# Patient Record
Sex: FEMALE | Race: BLACK OR AFRICAN AMERICAN | NOT HISPANIC OR LATINO | Employment: FULL TIME | URBAN - METROPOLITAN AREA
[De-identification: names, ages, dates, MRNs, and addresses within clinical notes are randomized per-mention and may not be internally consistent; named-entity substitution may affect disease eponyms.]

---

## 2017-01-10 ENCOUNTER — OFFICE VISIT (OUTPATIENT)
Dept: FAMILY MEDICINE | Facility: CLINIC | Age: 22
End: 2017-01-10
Payer: COMMERCIAL

## 2017-01-10 VITALS
TEMPERATURE: 98.2 F | SYSTOLIC BLOOD PRESSURE: 124 MMHG | DIASTOLIC BLOOD PRESSURE: 84 MMHG | OXYGEN SATURATION: 97 % | HEART RATE: 118 BPM | WEIGHT: 288.4 LBS | BODY MASS INDEX: 40.38 KG/M2 | HEIGHT: 71 IN

## 2017-01-10 DIAGNOSIS — Z78.9 BODY PIERCING: ICD-10-CM

## 2017-01-10 DIAGNOSIS — Z23 NEED FOR HPV VACCINE: ICD-10-CM

## 2017-01-10 DIAGNOSIS — Z12.4 SCREENING FOR MALIGNANT NEOPLASM OF CERVIX: ICD-10-CM

## 2017-01-10 DIAGNOSIS — Z11.3 SCREENING EXAMINATION FOR VENEREAL DISEASE: ICD-10-CM

## 2017-01-10 DIAGNOSIS — Z00.00 ROUTINE HISTORY AND PHYSICAL EXAMINATION OF ADULT: Primary | ICD-10-CM

## 2017-01-10 DIAGNOSIS — K80.20 CALCULUS OF GALLBLADDER WITHOUT CHOLECYSTITIS WITHOUT OBSTRUCTION: ICD-10-CM

## 2017-01-10 LAB
HBA1C MFR BLD: 4.5 % (ref 4.3–6)
TSH SERPL DL<=0.005 MIU/L-ACNC: 1.64 MU/L (ref 0.4–4)

## 2017-01-10 PROCEDURE — G0145 SCR C/V CYTO,THINLAYER,RESCR: HCPCS | Performed by: NURSE PRACTITIONER

## 2017-01-10 PROCEDURE — 83036 HEMOGLOBIN GLYCOSYLATED A1C: CPT | Performed by: NURSE PRACTITIONER

## 2017-01-10 PROCEDURE — 99385 PREV VISIT NEW AGE 18-39: CPT | Performed by: NURSE PRACTITIONER

## 2017-01-10 PROCEDURE — 87491 CHLMYD TRACH DNA AMP PROBE: CPT | Performed by: NURSE PRACTITIONER

## 2017-01-10 PROCEDURE — 87591 N.GONORRHOEAE DNA AMP PROB: CPT | Performed by: NURSE PRACTITIONER

## 2017-01-10 PROCEDURE — 90651 9VHPV VACCINE 2/3 DOSE IM: CPT | Performed by: NURSE PRACTITIONER

## 2017-01-10 PROCEDURE — 36415 COLL VENOUS BLD VENIPUNCTURE: CPT | Performed by: NURSE PRACTITIONER

## 2017-01-10 PROCEDURE — 84443 ASSAY THYROID STIM HORMONE: CPT | Performed by: NURSE PRACTITIONER

## 2017-01-10 RX ORDER — MUPIROCIN 20 MG/G
OINTMENT TOPICAL 3 TIMES DAILY
Qty: 15 G | Refills: 0 | Status: SHIPPED | OUTPATIENT
Start: 2017-01-10 | End: 2017-01-17

## 2017-01-10 ASSESSMENT — PAIN SCALES - GENERAL: PAINLEVEL: NO PAIN (0)

## 2017-01-10 NOTE — PROGRESS NOTES
SUBJECTIVE:     CC: Daphne Pollard is an 21 year old woman who presents for preventive health visit.     Healthy Habits:    Do you get at least three servings of calcium containing foods daily (dairy, green leafy vegetables, etc.)? yes    Amount of exercise or daily activities, outside of work: 5 day(s) per week    Problems taking medications regularly not applicable    Medication side effects: No    Have you had an eye exam in the past two years? no    Do you see a dentist twice per year? no    Do you have sleep apnea, excessive snoring or daytime drowsiness?no        Chief Complaint   Patient presents with     Physical     Discuss past gallstones issues, weight, and infected nose piercing.     Patient has a history of gallstones.  She had imaging done in the past that showed gallstones.  She denies pain, nausea at this time.  She wonders if anything else needs to be done at this time.    Patient had her right nare pierced in 10/16.  She has noticed some occasional pus with pulling out her piercing.  She notices swelling on occasion as well.    Today's PHQ-2 Score:   PHQ-2 ( 1999 Pfizer) 1/10/2017   Q1: Little interest or pleasure in doing things 0   Q2: Feeling down, depressed or hopeless 0   PHQ-2 Score 0       Abuse: Current or Past(Physical, Sexual or Emotional)- No  Do you feel safe in your environment - Yes    Social History   Substance Use Topics     Smoking status: Former Smoker     Types: Cigarettes     Quit date: 01/10/2015     Smokeless tobacco: Not on file     Alcohol Use: Yes     The patient does not drink >3 drinks per day nor >7 drinks per week.    No results for input(s): CHOL, HDL, LDL, TRIG, CHOLHDLRATIO, NHDL in the last 59769 hours.    Reviewed orders with patient.  Reviewed health maintenance and updated orders accordingly - Yes    Mammo Decision Support:  Mammogram not appropriate for this patient based on age.    Pertinent mammograms are reviewed under the imaging tab.  History of  abnormal Pap smear: NO - age 21-29 PAP every 3 years recommended  All Histories reviewed and updated in Epic.      ROS:  C: NEGATIVE for fever, chills, change in weight  I: NEGATIVE for worrisome rashes, moles or lesions  E: NEGATIVE for vision changes or irritation  ENT: NEGATIVE for ear, mouth and throat problems  R: NEGATIVE for significant cough or SOB  B: NEGATIVE for masses, tenderness or discharge  CV: NEGATIVE for chest pain, palpitations or peripheral edema  GI: NEGATIVE for nausea, abdominal pain, heartburn, or change in bowel habits  : NEGATIVE for unusual urinary or vaginal symptoms. Periods are regular.  M: NEGATIVE for significant arthralgias or myalgia  N: NEGATIVE for weakness, dizziness or paresthesias  P: NEGATIVE for changes in mood or affect    Problem list, Medication list, Allergies, and Medical/Social/Surgical histories reviewed in EPIC and updated as appropriate.  Labs reviewed in EPIC  BP Readings from Last 3 Encounters:   01/10/17 124/84    Wt Readings from Last 3 Encounters:   01/10/17 288 lb 6.4 oz (130.817 kg)                  Patient Active Problem List   Diagnosis     Calculus of gallbladder without cholecystitis without obstruction     Past Surgical History   Procedure Laterality Date     As rad resec tonsil/pillars  2002     Ent surgery       adenoidectomy       Social History   Substance Use Topics     Smoking status: Former Smoker     Types: Cigarettes     Quit date: 01/10/2015     Smokeless tobacco: Not on file     Alcohol Use: Yes     Family History   Problem Relation Age of Onset     Hypertension Mother      Coronary Artery Disease Mother      DIABETES Mother      Hypertension Father      Coronary Artery Disease Father      DIABETES Maternal Grandmother      DIABETES Maternal Grandfather      Hypertension Paternal Grandmother      Coronary Artery Disease Paternal Grandmother      Hypertension Paternal Grandfather      Coronary Artery Disease Paternal Grandfather       "Depression Sister      Anxiety Disorder Sister      CEREBROVASCULAR DISEASE Paternal Half-Sister          Current Outpatient Prescriptions   Medication Sig Dispense Refill     mupirocin (BACTROBAN) 2 % ointment Apply topically 3 times daily for 7 days 15 g 0     No Known Allergies  No lab results found.   OBJECTIVE:     /100 mmHg  Pulse 118  Temp(Src) 98.2  F (36.8  C) (Oral)  Ht 5' 10.5\" (1.791 m)  Wt 288 lb 6.4 oz (130.817 kg)  BMI 40.78 kg/m2  SpO2 97%  LMP 12/12/2016 (Approximate)  Breastfeeding? No  EXAM:  GENERAL: healthy, alert and no distress  EYES: Eyes grossly normal to inspection, PERRL and conjunctivae and sclerae normal  HENT: ear canals and TM's normal, nose and mouth without ulcers or lesions  NECK: no adenopathy, no asymmetry, masses, or scars and thyroid normal to palpation  RESP: lungs clear to auscultation - no rales, rhonchi or wheezes  BREAST: normal without masses, tenderness or nipple discharge and no palpable axillary masses or adenopathy  CV: regular rate and rhythm, normal S1 S2, no S3 or S4, no murmur, click or rub, no peripheral edema and peripheral pulses strong  ABDOMEN: soft, nontender, no hepatosplenomegaly, no masses and bowel sounds normal   (female): normal female external genitalia, normal urethral meatus, vaginal mucosa, normal cervix/adnexa/uterus without masses or discharge  MS: no gross musculoskeletal defects noted, no edema  SKIN: Piercing noted to right nare, some mild erythema surrounding piercing.  PSYCH: mentation appears normal, affect normal/bright    ASSESSMENT/PLAN:     1. Routine history and physical examination of adult      2. BMI 40.0-44.9, adult (H)    - Hemoglobin A1c  - TSH with free T4 reflex  - NUTRITION REFERRAL    3. Screening examination for venereal disease    - NEISSERIA GONORRHOEA PCR  - CHLAMYDIA TRACHOMATIS PCR    4. Screening for malignant neoplasm of cervix    - Pap imaged thin layer screen only - recommended age 21 - 24 years    5. " "Need for HPV vaccine    - HUMAN PAPILLOMA VIRUS (GARDASIL 9) VACCINE    6. Body piercing    - mupirocin (BACTROBAN) 2 % ointment; Apply topically 3 times daily for 7 days  Dispense: 15 g; Refill: 0    7. Calculus of gallbladder without cholecystitis without obstruction  I discussed s/s of cholecystitis with patient.  Since she is not currently having any pain, I recommended continued low fat diet and monitoring for symptoms.  Patient verbalized understanding.       COUNSELING:   Reviewed preventive health counseling, as reflected in patient instructions         reports that she quit smoking about 2 years ago. Her smoking use included Cigarettes. She does not have any smokeless tobacco history on file.    Estimated body mass index is 40.78 kg/(m^2) as calculated from the following:    Height as of this encounter: 5' 10.5\" (1.791 m).    Weight as of this encounter: 288 lb 6.4 oz (130.817 kg).   Weight management plan: Discussed healthy diet and exercise guidelines and patient will follow up in 12 months in clinic to re-evaluate.    Counseling Resources:  ATP IV Guidelines  Pooled Cohorts Equation Calculator  Breast Cancer Risk Calculator  FRAX Risk Assessment  ICSI Preventive Guidelines  Dietary Guidelines for Americans, 2010  USDA's MyPlate  ASA Prophylaxis  Lung CA Screening    AISHWARYA Gonzalez CNP  [facility]  "

## 2017-01-10 NOTE — MR AVS SNAPSHOT
After Visit Summary   1/10/2017    Daphne Pollard    MRN: [id number]           Patient Information     Date Of Birth          1995        Visit Information        Provider Department      1/10/2017 10:20 AM Therese River APRN [facility] [place]        Today's Diagnoses     Routine history and physical examination of adult    -  1     BMI 40.0-44.9, adult (H)         Screening examination for venereal disease         Screening for malignant neoplasm of cervix         Need for HPV vaccine         Body piercing         Calculus of gallbladder without cholecystitis without obstruction           Care Instructions      Preventive Health Recommendations  Female Ages 18 to 25     Yearly exam:     See your health care provider every year in order to  o Review health changes.   o Discuss preventive care.    o Review your medicines if your doctor has prescribed any.      You should be tested each year for STDs (sexually transmitted diseases).       After age 20, talk to your provider about how often you should have cholesterol testing.      Starting at age 21, get a Pap test every three years. If you have an abnormal result, your doctor may have you test more often.      If you are at risk for diabetes, you should have a diabetes test (fasting glucose).     Shots:     Get a flu shot each year.     Get a tetanus shot every 10 years.     Consider getting the shot (vaccine) that prevents cervical cancer (Gardasil).    Nutrition:     Eat at least 5 servings of fruits and vegetables each day.    Eat whole-grain bread, whole-wheat pasta and brown rice instead of white grains and rice.    Talk to your provider about Calcium and Vitamin D.     Lifestyle    Exercise at least 150 minutes a week each week (30 minutes a day, 5 days a week). This will help you control your weight and prevent disease.    Limit alcohol to one drink per day.    No smoking.     Wear sunscreen to prevent skin  cancer.    See your dentist every six months for an exam and cleaning.      [facility]    If you have any questions regarding to your visit please contact your care team:     Team Pink:   Clinic Hours Telephone Number   Internal Medicine:  Dr. Chrissie River, NP       7am-7pm  Monday - Thursday   7am-5pm  Fridays  [phone]  (Appointment scheduling available 24/7)    Questions about your visit?  Team Line  [phone]   [facility] [place] [facility] 11am-9pm Monday-Friday Saturday-Sunday- 9am-5pm   [place] - 5pm-9pm Monday-Friday Saturday-Sunday- 9am-5pm  [phone] - [place]   [phone] - [place]       What options do I have for visits at the clinic other than the traditional office visit?  To expand how we care for you, many of our providers are utilizing electronic visits (e-visits) and telephone visits, when medically appropriate, for interactions with their patients rather than a visit in the clinic.   We also offer nurse visits for many medical concerns. Just like any other service, we will bill your insurance company for this type of visit based on time spent on the phone with your provider. Not all insurance companies cover these visits. Please check with your medical insurance if this type of visit is covered. You will be responsible for any charges that are not paid by your insurance.      E-visits via [employer]:  generally incur a $35.00 fee.  Telephone visits:  Time spent on the phone: *charged based on time that is spent on the phone in increments of 10 minutes. Estimated cost:   5-10 mins $30.00   11-20 mins. $59.00   21-30 mins. $85.00   Use [employer] (secure email communication and access to your chart) to send your primary care provider a message or make an appointment. Ask someone on your Team how to sign up for [employer].    For a Price Quote for your services, please call our Consumer Price Line at  "[phone].    As always, Thank you for trusting us with your health care needs!    Discharged by Daphne SANTANA CMA ([facility])          Follow-ups after your visit        Additional Services     NUTRITION REFERRAL       Your provider has referred you to: [place]: [facility] [phone]   [email or web address]    Please be aware that coverage of these services is subject to the terms and limitations of your health insurance plan.  Call member services at your health plan with any benefit or coverage questions.      Please bring the following with you to your appointment:    (1) This referral request  (2) Any documents given to you regarding this referral  (3) Any specific questions you have about diet and/or food choices                  Who to contact     If you have questions or need follow up information about today's clinic visit or your schedule please contact [facility] directly at [phone].  Normal or non-critical lab and imaging results will be communicated to you by MyChart, letter or phone within 4 business days after the clinic has received the results. If you do not hear from us within 7 days, please contact the clinic through [employer] or phone. If you have a critical or abnormal lab result, we will notify you by phone as soon as possible.  Submit refill requests through [employer] or call your pharmacy and they will forward the refill request to us. Please allow 3 business days for your refill to be completed.          Additional Information About Your Visit        [employer] Information     [employer] lets you send messages to your doctor, view your test results, renew your prescriptions, schedule appointments and more. To sign up, go to [email or web address] . Click on \"Log in\" on the left side of the screen, which will take you to the Welcome page. Then click on \"Sign up Now\" on the right side of the page.     You will be asked to enter the access code " "listed below, as well as some personal information. Please follow the directions to create your username and password.     Your access code is: [email or web address]  Expires: 4/10/2017 11:27 AM     Your access code will  in 90 days. If you need help or a new code, please call your [facility] clinic or [phone].        Care EveryWhere ID     This is your Care EveryWhere ID. This could be used by other organizations to access your [facility] medical records  [id number]        Your Vitals Were     Pulse Temperature Height    118 98.2  F (36.8  C) (Oral) 5' 10.5\" (1.791 m)    BMI (Body Mass Index) Pulse Oximetry Last Period    40.78 kg/m2 97% 2016 (Approximate)    Breastfeeding?          No         Blood Pressure from Last 3 Encounters:   01/10/17 124/84    Weight from Last 3 Encounters:   01/10/17 288 lb 6.4 oz (130.817 kg)              We Performed the Following     CHLAMYDIA TRACHOMATIS PCR     Hemoglobin A1c     HUMAN PAPILLOMA VIRUS (GARDASIL 9) VACCINE     NEISSERIA GONORRHOEA PCR     NUTRITION REFERRAL     Pap imaged thin layer screen only - recommended age 21 - 24 years     TSH with free T4 reflex          Today's Medication Changes          These changes are accurate as of: 1/10/17 11:27 AM.  If you have any questions, ask your nurse or doctor.               Start taking these medicines.        Dose/Directions    mupirocin 2 % ointment   Commonly known as:  BACTROBAN   Used for:  Body piercing   Started by:  Therese River APRN CNP        Apply topically 3 times daily for 7 days   Quantity:  15 g   Refills:  0            Where to get your medicines      These medications were sent to [facility] - [address]  [address], [place] [address]     Phone:  [phone]    - mupirocin 2 % ointment             Primary Care Provider Office Phone # Fax #    Rocío Payton [phone]       [facility] " JULESMIRYAM KENNETH [facility] [address]  [facility] [address]        Thank you!     Thank you for choosing [facility]  for your care. Our goal is always to provide you with excellent care. Hearing back from our patients is one way we can continue to improve our services. Please take a few minutes to complete the written survey that you may receive in the mail after your visit with us. Thank you!             Your Updated Medication List - Protect others around you: Learn how to safely use, store and throw away your medicines at www.disposemymeds.org.          This list is accurate as of: 1/10/17 11:27 AM.  Always use your most recent med list.                   Brand Name Dispense Instructions for use    mupirocin 2 % ointment    BACTROBAN    15 g    Apply topically 3 times daily for 7 days

## 2017-01-10 NOTE — NURSING NOTE
"Chief Complaint   Patient presents with     Physical     Discuss past gallstones issues, weight, and infected nose piercing.       Initial /100 mmHg  Pulse 118  Temp(Src) 98.2  F (36.8  C) (Oral)  Ht 5' 10.5\" (1.791 m)  Wt 288 lb 6.4 oz (130.817 kg)  BMI 40.78 kg/m2  SpO2 97%  LMP 12/12/2016 (Approximate)  Breastfeeding? No Estimated body mass index is 40.78 kg/(m^2) as calculated from the following:    Height as of this encounter: 5' 10.5\" (1.791 m).    Weight as of this encounter: 288 lb 6.4 oz (130.817 kg).  BP completed using cuff size: tim King CMA      "

## 2017-01-10 NOTE — NURSING NOTE
Screening Questionnaire for Adult Immunization    Are you sick today?   No   Do you have allergies to medications, food, a vaccine component or latex?   No   Have you ever had a serious reaction after receiving a vaccination?   No   Do you have a long-term health problem with heart disease, lung disease, asthma, kidney disease, metabolic disease (e.g. diabetes), anemia, or other blood disorder?   No   Do you have cancer, leukemia, HIV/AIDS, or any other immune system problem?   No   In the past 3 months, have you taken medications that affect  your immune system, such as prednisone, other steroids, or anticancer drugs; drugs for the treatment of rheumatoid arthritis, Crohn s disease, or psoriasis; or have you had radiation treatments?   No   Have you had a seizure, or a brain or other nervous system problem?   No   During the past year, have you received a transfusion of blood or blood     products, or been given immune (gamma) globulin or antiviral drug?   No   For women: Are you pregnant or is there a chance you could become        pregnant during the next month?   No   Have you received any vaccinations in the past 4 weeks?   No     Immunization questionnaire answers were all negative.      MNVFC doesn't apply on this patient    Per orders of Therese River, injection of HPV given by Daphne Dumont. Patient instructed to remain in clinic for 20 minutes afterwards, and to report any adverse reaction to me immediately.       Screening performed by Daphne Dumont on 1/10/2017 at 11:46 AM.

## 2017-01-10 NOTE — PATIENT INSTRUCTIONS
Preventive Health Recommendations  Female Ages 18 to 25     Yearly exam:     See your health care provider every year in order to  o Review health changes.   o Discuss preventive care.    o Review your medicines if your doctor has prescribed any.      You should be tested each year for STDs (sexually transmitted diseases).       After age 20, talk to your provider about how often you should have cholesterol testing.      Starting at age 21, get a Pap test every three years. If you have an abnormal result, your doctor may have you test more often.      If you are at risk for diabetes, you should have a diabetes test (fasting glucose).     Shots:     Get a flu shot each year.     Get a tetanus shot every 10 years.     Consider getting the shot (vaccine) that prevents cervical cancer (Gardasil).    Nutrition:     Eat at least 5 servings of fruits and vegetables each day.    Eat whole-grain bread, whole-wheat pasta and brown rice instead of white grains and rice.    Talk to your provider about Calcium and Vitamin D.     Lifestyle    Exercise at least 150 minutes a week each week (30 minutes a day, 5 days a week). This will help you control your weight and prevent disease.    Limit alcohol to one drink per day.    No smoking.     Wear sunscreen to prevent skin cancer.    See your dentist every six months for an exam and cleaning.      [facility]    If you have any questions regarding to your visit please contact your care team:     Team Pink:   Clinic Hours Telephone Number   Internal Medicine:  Dr. Chrissie River, NP       7am-7pm  Monday - Thursday   7am-5pm  Fridays  [phone]  (Appointment scheduling available 24/7)    Questions about your visit?  Team Line  [phone]   Urgent Care - [place] - 11am-9pm Monday-Friday Saturday-Sunday- 9am-5pm   [place] - 5pm-9pm Monday-Friday Saturday-Sunday- 9am-5pm  [phone] - [place]    [phone] - [place]       What options do I have for visits at the clinic other than the traditional office visit?  To expand how we care for you, many of our providers are utilizing electronic visits (e-visits) and telephone visits, when medically appropriate, for interactions with their patients rather than a visit in the clinic.   We also offer nurse visits for many medical concerns. Just like any other service, we will bill your insurance company for this type of visit based on time spent on the phone with your provider. Not all insurance companies cover these visits. Please check with your medical insurance if this type of visit is covered. You will be responsible for any charges that are not paid by your insurance.      E-visits via [employer]:  generally incur a $35.00 fee.  Telephone visits:  Time spent on the phone: *charged based on time that is spent on the phone in increments of 10 minutes. Estimated cost:   5-10 mins $30.00   11-20 mins. $59.00   21-30 mins. $85.00   Use [employer] (secure email communication and access to your chart) to send your primary care provider a message or make an appointment. Ask someone on your Team how to sign up for [employer].    For a Price Quote for your services, please call our Consumer Price Line at [phone].    As always, Thank you for trusting us with your health care needs!    Discharged by Daphne SANTANA CMA ([facility])

## 2017-01-10 NOTE — Clinical Note
[address]  [place] [address]  [phone]      January 17, 2017    Daphne Pollard  [address]   [place] [address]          Dear Daphne,    I am happy to inform you that your recent cervical cancer screening test (PAP smear) was normal.      Preventative screening such as this helps insure your health for years to come.  This test should be repeated in 3 years unless otherwise directed.    You will still need to return to the clinic every year for your annual exam and other preventive tests.    Please contact the clinic if you have further questions.      Sincerely,    AISHWARYA Gonzalez CNP/rlm

## 2017-01-10 NOTE — Clinical Note
[facility]  [address] [place] [address]    January 11, 2017    Daphne Pollard  [address]   [place] [address]          Dear Daphne,  Normal thyroid.  No evidence of diabetes.  Good 3 month blood sugar average.  No gonorrhea or chlamydia detected.  Enclosed is a copy of your results.     Results for orders placed or performed in visit on 01/10/17   Hemoglobin A1c   Result Value Ref Range    Hemoglobin A1C 4.5 4.3 - 6.0 %   TSH with free T4 reflex   Result Value Ref Range    TSH 1.64 0.40 - 4.00 mU/L   NEISSERIA GONORRHOEA PCR   Result Value Ref Range    Specimen Descrip Cervix     N Gonorrhea PCR  NEG     Negative   Negative for N. gonorrhoeae rRNA by transcription mediated amplification.   A negative result by transcription mediated amplification does not preclude the   presence of N. gonorrhoeae infection because results are dependent on proper   and adequate collection, absence of inhibitors, and sufficient rRNA to be   detected.     CHLAMYDIA TRACHOMATIS PCR   Result Value Ref Range    Specimen Description Cervix     Chlamydia Trachomatis PCR  NEG     Negative   Negative for C. trachomatis rRNA by transcription mediated amplification.   A negative result by transcription mediated amplification does not preclude the   presence of C. trachomatis infection because results are dependent on proper   and adequate collection, absence of inhibitors, and sufficient rRNA to be   detected.         If you have any questions or concerns, please call myself or my nurse at [phone].      Sincerely,        Therese River CNP/pb

## 2017-01-11 LAB
C TRACH DNA SPEC QL NAA+PROBE: NORMAL
N GONORRHOEA DNA SPEC QL NAA+PROBE: NORMAL
SPECIMEN SOURCE: NORMAL
SPECIMEN SOURCE: NORMAL

## 2017-01-11 NOTE — PROGRESS NOTES
Quick Note:    Dear Daphne,    Your recent test results are attached.     Normal thyroid.  No evidence of diabetes. Good 3 month blood sugar average.  No gonorrhea or chlamydia detected.      If you have any questions please feel free to contact [phone] or myself via InSpahart.    Sincerely,  Therese River, CNP    ______

## 2017-01-12 LAB
COPATH REPORT: NORMAL
PAP: NORMAL

## 2017-04-05 ENCOUNTER — OFFICE VISIT (OUTPATIENT)
Dept: URGENT CARE | Facility: URGENT CARE | Age: 22
End: 2017-04-05
Payer: COMMERCIAL

## 2017-04-05 VITALS
OXYGEN SATURATION: 100 % | HEART RATE: 98 BPM | TEMPERATURE: 98.1 F | WEIGHT: 274 LBS | DIASTOLIC BLOOD PRESSURE: 74 MMHG | BODY MASS INDEX: 38.76 KG/M2 | SYSTOLIC BLOOD PRESSURE: 112 MMHG

## 2017-04-05 DIAGNOSIS — R07.0 THROAT PAIN: Primary | ICD-10-CM

## 2017-04-05 LAB
DEPRECATED S PYO AG THROAT QL EIA: NORMAL
MICRO REPORT STATUS: NORMAL
SPECIMEN SOURCE: NORMAL

## 2017-04-05 PROCEDURE — 87081 CULTURE SCREEN ONLY: CPT | Performed by: PHYSICIAN ASSISTANT

## 2017-04-05 PROCEDURE — 87880 STREP A ASSAY W/OPTIC: CPT | Performed by: PHYSICIAN ASSISTANT

## 2017-04-05 PROCEDURE — 99213 OFFICE O/P EST LOW 20 MIN: CPT | Performed by: FAMILY MEDICINE

## 2017-04-05 NOTE — PROGRESS NOTES
SUBJECTIVE:Daphne Pollard is a 21 year old female with a chief complaint of sore throat.    Onset of symptoms was 2 day(s) ago.    Course of illness: still present.    Severity moderate  Current and Associated symptoms: nasal congestion  Treatment measures tried include OTC meds.  Predisposing factors include None.    No past medical history on file.  No Known Allergies  Social History   Substance Use Topics     Smoking status: Former Smoker     Types: Cigarettes     Quit date: 1/10/2015     Smokeless tobacco: Not on file     Alcohol use Yes       ROS:  SKIN: no rash  GI: no vomiting    OBJECTIVE:   /74 (BP Location: Right arm, Patient Position: Chair, Cuff Size: Adult Large)  Pulse 98  Temp 98.1  F (36.7  C) (Oral)  Wt 274 lb (124.3 kg)  SpO2 100%  BMI 38.76 kg/[id number] APPEARANCE: healthy, alert and no distress  EYES: EOMI,  PERRL, conjunctiva clear  HENT: ear canals and TM's normal.  Nose normal.  Pharynx erythematous with some exudate noted.  NECK: supple, non-tender to palpation, no adenopathy noted  RESP: lungs clear to auscultation - no rales, rhonchi or wheezes  SKIN: no suspicious lesions or rashes    Rapid Strep test is negative; await throat culture results.      ICD-10-CM    1. Throat pain R07.0 Strep, Rapid Screen     Beta strep group A culture       Symptomatic treat with gargles, lozenges, and OTC analgesic as needed.  Follow-up with primary clinic if not improving.

## 2017-04-05 NOTE — NURSING NOTE
"Chief Complaint   Patient presents with     URI     congestion and st for 2 days       Initial /74 (BP Location: Right arm, Patient Position: Chair, Cuff Size: Adult Large)  Pulse 98  Temp 98.1  F (36.7  C) (Oral)  Wt 274 lb (124.3 kg)  SpO2 100%  BMI 38.76 kg/m2 Estimated body mass index is 38.76 kg/(m^2) as calculated from the following:    Height as of 1/10/17: 5' 10.5\" (1.791 m).    Weight as of this encounter: 274 lb (124.3 kg).  Medication Reconciliation: complete   Ottoniel DUCKWORTH      "

## 2017-04-05 NOTE — MR AVS SNAPSHOT
"              After Visit Summary   4/5/2017    Daphne Pollard    MRN: [id number]           Patient Information     Date Of Birth          1995        Visit Information        Provider Department      4/5/2017 4:00 PM Mike Allen, DO [facility]        Today's Diagnoses     Throat pain    -  1       Follow-ups after your visit        Your next 10 appointments already scheduled     Apr 11, 2017 10:00 AM CDT   Nurse Only with [facility]   [facility]    [address] [address] [phone]              Who to contact     If you have questions or need follow up information about today's clinic visit or your schedule please contact [facility] directly at [phone].  Normal or non-critical lab and imaging results will be communicated to you by MyChart, letter or phone within 4 business days after the clinic has received the results. If you do not hear from us within 7 days, please contact the clinic through MyChart or phone. If you have a critical or abnormal lab result, we will notify you by phone as soon as possible.  Submit refill requests through Wowboard or call your pharmacy and they will forward the refill request to us. Please allow 3 business days for your refill to be completed.          Additional Information About Your Visit        MyChart Information     Wowboard lets you send messages to your doctor, view your test results, renew your prescriptions, schedule appointments and more. To sign up, go to [email or web address] . Click on \"Log in\" on the left side of the screen, which will take you to the Welcome page. Then click on \"Sign up Now\" on the right side of the page.     You will be asked to enter the access code listed below, as well as some personal information. Please follow the directions to create your username and password.     Your access code is: " [email or web address]  Expires: 4/10/2017 12:27 PM     Your access code will  in 90 days. If you need help or a new code, please call your [place] clinic or [phone].        Care EveryWhere ID     This is your Care EveryWhere ID. This could be used by other organizations to access your [place] medical records  [id number]        Your Vitals Were     Pulse Temperature Pulse Oximetry BMI (Body Mass Index)          98 98.1  F (36.7  C) (Oral) 100% 38.76 kg/m2         Blood Pressure from Last 3 Encounters:   17 112/74   01/10/17 124/84    Weight from Last 3 Encounters:   17 274 lb (124.3 kg)   01/10/17 288 lb 6.4 oz (130.8 kg)              We Performed the Following     Beta strep group A culture     Strep, Rapid Screen        Primary Care Provider Office Phone # Fax #    Rocío FISHER MD [place] [phone]       [facility] [address]  [place] [address]        Thank you!     Thank you for choosing [facility]  for your care. Our goal is always to provide you with excellent care. Hearing back from our patients is one way we can continue to improve our services. Please take a few minutes to complete the written survey that you may receive in the mail after your visit with us. Thank you!             Your Updated Medication List - Protect others around you: Learn how to safely use, store and throw away your medicines at www.disposemymeds.org.      Notice  As of 2017  5:40 PM    You have not been prescribed any medications.

## 2017-04-07 LAB
BACTERIA SPEC CULT: NORMAL
MICRO REPORT STATUS: NORMAL
SPECIMEN SOURCE: NORMAL

## 2017-04-18 ENCOUNTER — ALLIED HEALTH/NURSE VISIT (OUTPATIENT)
Dept: NURSING | Facility: CLINIC | Age: 22
End: 2017-04-18
Payer: COMMERCIAL

## 2017-04-18 DIAGNOSIS — Z23 NEED FOR VACCINATION: Primary | ICD-10-CM

## 2017-04-18 PROCEDURE — 99207 ZZC NO CHARGE NURSE ONLY: CPT

## 2017-04-18 PROCEDURE — 90471 IMMUNIZATION ADMIN: CPT

## 2017-04-18 PROCEDURE — 90651 9VHPV VACCINE 2/3 DOSE IM: CPT

## 2017-04-18 NOTE — NURSING NOTE
Prior to injection verified patient identity using patient's name and date of birth.Patient instructed to remain in clinic for 20 minutes afterwards, and to report any adverse reaction immediately.  Screening Questionnaire for Adult Immunization    Are you sick today?   No   Do you have allergies to medications, food, a vaccine component or latex?   No   Have you ever had a serious reaction after receiving a vaccination?   No   Do you have a long-term health problem with heart disease, lung disease, asthma, kidney disease, metabolic disease (e.g. diabetes), anemia, or other blood disorder?   No   Do you have cancer, leukemia, HIV/AIDS, or any other immune system problem?   No   In the past 3 months, have you taken medications that affect  your immune system, such as prednisone, other steroids, or anticancer drugs; drugs for the treatment of rheumatoid arthritis, Crohn s disease, or psoriasis; or have you had radiation treatments?   No   Have you had a seizure, or a brain or other nervous system problem?   No   During the past year, have you received a transfusion of blood or blood     products, or been given immune (gamma) globulin or antiviral drug?   No   For women: Are you pregnant or is there a chance you could become        pregnant during the next month?   No   Have you received any vaccinations in the past 4 weeks?   No     Immunization questionnaire answers were all negative.      MNVFC doesn't apply on this patient       Screening performed by LIO NORWOOD on 4/18/2017 at 12:20 PM.

## 2017-04-18 NOTE — MR AVS SNAPSHOT
"              After Visit Summary   2017    Daphne Pollard    MRN: [id number]           Patient Information     Date Of Birth          1995        Visit Information        Provider Department      2017 12:00 PM [facility] [place]        Today's Diagnoses     Need for vaccination    -  1       Follow-ups after your visit        Who to contact     If you have questions or need follow up information about today's clinic visit or your schedule please contact [facility] directly at [phone].  Normal or non-critical lab and imaging results will be communicated to you by MyChart, letter or phone within 4 business days after the clinic has received the results. If you do not hear from us within 7 days, please contact the clinic through GoIP Internationalhart or phone. If you have a critical or abnormal lab result, we will notify you by phone as soon as possible.  Submit refill requests through Edsby or call your pharmacy and they will forward the refill request to us. Please allow 3 business days for your refill to be completed.          Additional Information About Your Visit        MyChart Information     Edsby lets you send messages to your doctor, view your test results, renew your prescriptions, schedule appointments and more. To sign up, go to [email or web address] . Click on \"Log in\" on the left side of the screen, which will take you to the Welcome page. Then click on \"Sign up Now\" on the right side of the page.     You will be asked to enter the access code listed below, as well as some personal information. Please follow the directions to create your username and password.     Your access code is: [email or web address]  Expires: 7/10/2017 10:37 AM     Your access code will  in 90 days. If you need help or a new code, please call your [facility] or [phone].        Care EveryWhere ID     This is your Care EveryWhere ID. This could be used by other organizations to " access your [place] medical records  [id number]         Blood Pressure from Last 3 Encounters:   04/05/17 112/74   01/10/17 124/84    Weight from Last 3 Encounters:   04/05/17 274 lb (124.3 kg)   01/10/17 288 lb 6.4 oz (130.8 kg)              We Performed the Following     1st  Administration  [[id number]]     HUMAN PAPILLOMA VIRUS (GARDASIL 9) VACCINE [[id number]]        Primary Care Provider Office Phone # Fax #    Rocío Payton [phone]       [facility] [address]  [place] [address]        Thank you!     Thank you for choosing [facility]  for your care. Our goal is always to provide you with excellent care. Hearing back from our patients is one way we can continue to improve our services. Please take a few minutes to complete the written survey that you may receive in the mail after your visit with us. Thank you!             Your Updated Medication List - Protect others around you: Learn how to safely use, store and throw away your medicines at www.disposemymeds.org.      Notice  As of 4/18/2017 12:31 PM    You have not been prescribed any medications.

## 2017-05-23 ENCOUNTER — TELEPHONE (OUTPATIENT)
Dept: INTERNAL MEDICINE | Facility: CLINIC | Age: 22
End: 2017-05-23

## 2017-05-23 NOTE — TELEPHONE ENCOUNTER
Called and spoke with Daphne she provided the fax number [phone].    And a copy was left a the  for . Theodora Lyle,

## 2017-05-23 NOTE — TELEPHONE ENCOUNTER
Reason for Call: Request for an order or referral:    Order or referral being requested: Referral     Date needed: as soon as possible    Has the patient been seen by the PCP for this problem? NO     Additional comments: Patient is scheduled for  tomorrow at 11:40am with [facility]. Patient was told she needs a referral from her primary. Please call when ready for her to pick it up.     Phone number Patient can be reached at:  Home number on file [phone] (home)    Best Time:  any    Can we leave a detailed message on this number?  YES    Call taken on 2017 at 3:10 PM by Cyndee Trivedi

## 2017-06-03 ENCOUNTER — HEALTH MAINTENANCE LETTER (OUTPATIENT)
Age: 22
End: 2017-06-03

## 2017-07-31 NOTE — PROGRESS NOTES
SUBJECTIVE:                                                    Daphne Pollard is a 21 year old female who presents to clinic today for the following health issues:      Patient presents with:  Contraception    Patient is interested in contraception.  Patient notes that her menses are regular and last 5 days.  She denies menorrhagia.  She complains of mild cramping with period.    She denies history of DVT/PE, migraine with aura, cardiovascular disease, liver disease, clotting disorder, hypertension, diabetes mellitus.       Problem list and histories reviewed & adjusted, as indicated.  Additional history: as documented    Patient Active Problem List   Diagnosis     Calculus of gallbladder without cholecystitis without obstruction     Past Surgical History:   Procedure Laterality Date     AS RAD RESEC TONSIL/PILLARS  2002     ENT SURGERY      adenoidectomy       Social History   Substance Use Topics     Smoking status: Former Smoker     Types: Cigarettes     Quit date: 1/10/2015     Smokeless tobacco: Never Used     Alcohol use Yes     Family History   Problem Relation Age of Onset     CEREBROVASCULAR DISEASE Paternal Half-Sister      Hypertension Mother      Coronary Artery Disease Mother      DIABETES Mother      Hypertension Father      Coronary Artery Disease Father      DIABETES Maternal Grandmother      DIABETES Maternal Grandfather      Hypertension Paternal Grandmother      Coronary Artery Disease Paternal Grandmother      Hypertension Paternal Grandfather      Coronary Artery Disease Paternal Grandfather      Depression Sister      Anxiety Disorder Sister          Current Outpatient Prescriptions   Medication Sig Dispense Refill     medroxyPROGESTERone (DEPO-PROVERA) 150 MG/ML injection Inject 1 mL (150 mg) into the muscle every 3 months 1 mL 3     No Known Allergies  BP Readings from Last 3 Encounters:   08/02/17 136/80   04/05/17 112/74   01/10/17 124/84    Wt Readings from Last 3 Encounters:  "  08/02/17 266 lb (120.7 kg)   04/05/17 274 lb (124.3 kg)   01/10/17 288 lb 6.4 oz (130.8 kg)                  Labs reviewed in EPIC        Reviewed and updated as needed this visit by clinical staff       Reviewed and updated as needed this visit by Provider         ROS:  Constitutional, HEENT, cardiovascular, pulmonary, gi and gu systems are negative, except as otherwise noted.      OBJECTIVE:   /80  Pulse 102  Temp 98  F (36.7  C) (Oral)  Ht 5' 10.5\" (1.791 m)  Wt 266 lb (120.7 kg)  LMP 07/28/2017  SpO2 97%  BMI 37.63 kg/m2  Body mass index is 37.63 kg/(m^2).  GENERAL: healthy, alert and no distress  RESP: lungs clear to auscultation - no rales, rhonchi or wheezes  CV: regular rate and rhythm, normal S1 S2, no S3 or S4, no murmur, click or rub, no peripheral edema and peripheral pulses strong  MS: no gross musculoskeletal defects noted, no edema    Diagnostic Test Results:  Results for orders placed or performed in visit on 08/02/17 (from the past 24 hour(s))   Beta HCG qual IFA urine   Result Value Ref Range    Beta HCG Qual IFA Urine Negative NEG       ASSESSMENT/PLAN:     1. Encounter for initial prescription of injectable contraceptive  I reviewed all types of contraception and potential side effects with patient.  Patient elected to start depo provera.  She has not had unprotected sex in the last 2 weeks and urine pregnancy test was negative.  Patient okay to start depo provera today.  - Beta HCG qual IFA urine  - medroxyPROGESTERone (DEPO-PROVERA) 150 MG/ML injection; Inject 1 mL (150 mg) into the muscle every 3 months  Dispense: 1 mL; Refill: 3    FUTURE APPOINTMENTS:       - Follow-up for annual visit or as needed    [facility]A [facility]  "

## 2017-08-02 ENCOUNTER — OFFICE VISIT (OUTPATIENT)
Dept: INTERNAL MEDICINE | Facility: CLINIC | Age: 22
End: 2017-08-02
Payer: COMMERCIAL

## 2017-08-02 VITALS
HEART RATE: 102 BPM | DIASTOLIC BLOOD PRESSURE: 80 MMHG | OXYGEN SATURATION: 97 % | TEMPERATURE: 98 F | SYSTOLIC BLOOD PRESSURE: 136 MMHG | BODY MASS INDEX: 37.24 KG/M2 | HEIGHT: 71 IN | WEIGHT: 266 LBS

## 2017-08-02 DIAGNOSIS — Z30.013 ENCOUNTER FOR INITIAL PRESCRIPTION OF INJECTABLE CONTRACEPTIVE: Primary | ICD-10-CM

## 2017-08-02 LAB — BETA HCG QUAL IFA URINE: NEGATIVE

## 2017-08-02 PROCEDURE — 84703 CHORIONIC GONADOTROPIN ASSAY: CPT | Performed by: NURSE PRACTITIONER

## 2017-08-02 PROCEDURE — 99213 OFFICE O/P EST LOW 20 MIN: CPT | Performed by: NURSE PRACTITIONER

## 2017-08-02 RX ORDER — MEDROXYPROGESTERONE ACETATE 150 MG/ML
150 INJECTION, SUSPENSION INTRAMUSCULAR
Qty: 1 ML | Refills: 3 | OUTPATIENT
Start: 2017-08-02 | End: 2018-09-21

## 2017-08-02 NOTE — MR AVS SNAPSHOT
After Visit Summary   8/2/2017    Daphne Pollard    MRN: [id number]           Patient Information     Date Of Birth          1995        Visit Information        Provider Department      8/2/2017 11:00 AM Therese River APRN [facility]        Today's Diagnoses     Encounter for initial prescription of injectable contraceptive    -  1      Care Instructions    [facility]    If you have any questions regarding to your visit please contact your care team:     Team Pink:   Clinic Hours Telephone Number   Internal Medicine:  Dr. Chrissie River, NP       7am-7pm  Monday - Thursday   7am-5pm  Fridays  [phone]  (Appointment scheduling available 24/7)    Questions about your visit?  Team Line  [phone]   Urgent Care - [place] - 11am-9pm Monday-Friday Saturday-Sunday- 9am-5pm   [place] - 5pm-9pm Monday-Friday Saturday-Sunday- 9am-5pm  [phone] - [place]   [phone] - [place]       What options do I have for visits at the clinic other than the traditional office visit?  To expand how we care for you, many of our providers are utilizing electronic visits (e-visits) and telephone visits, when medically appropriate, for interactions with their patients rather than a visit in the clinic.   We also offer nurse visits for many medical concerns. Just like any other service, we will bill your insurance company for this type of visit based on time spent on the phone with your provider. Not all insurance companies cover these visits. Please check with your medical insurance if this type of visit is covered. You will be responsible for any charges that are not paid by your insurance.      E-visits via NuLabel:  generally incur a $35.00 fee.  Telephone visits:  Time spent on the phone: *charged based on time that is spent on the phone in increments of 10 minutes. Estimated cost:   5-10  "mins $30.00   11-20 mins. $59.00   21-30 mins. $85.00   Use Pingerhart (secure email communication and access to your chart) to send your primary care provider a message or make an appointment. Ask someone on your Team how to sign up for Pingerhart.    For a Price Quote for your services, please call our Consumer Price Line at [phone].    As always, Thank you for trusting us with your health care needs!    Discharged by Daphne SANTANA CMA ([facility])            Follow-ups after your visit        Who to contact     If you have questions or need follow up information about today's clinic visit or your schedule please contact [facility] directly at [phone].  Normal or non-critical lab and imaging results will be communicated to you by Pingerhart, letter or phone within 4 business days after the clinic has received the results. If you do not hear from us within 7 days, please contact the clinic through Pingerhart or phone. If you have a critical or abnormal lab result, we will notify you by phone as soon as possible.  Submit refill requests through Purkinje or call your pharmacy and they will forward the refill request to us. Please allow 3 business days for your refill to be completed.          Additional Information About Your Visit        Pingerhart Information     Purkinje lets you send messages to your doctor, view your test results, renew your prescriptions, schedule appointments and more. To sign up, go to [email or web address] . Click on \"Log in\" on the left side of the screen, which will take you to the Welcome page. Then click on \"Sign up Now\" on the right side of the page.     You will be asked to enter the access code listed below, as well as some personal information. Please follow the directions to create your username and password.     Your access code is: [email or web address]  Expires: 10/31/2017 11:36 AM     Your access code will  in 90 days. If you need help or a new code, please call your [place] clinic " "or [phone].        Care EveryWhere ID     This is your Care EveryWhere ID. This could be used by other organizations to access your [place] medical records  [id number]        Your Vitals Were     Pulse Temperature Height Last Period Pulse Oximetry BMI (Body Mass Index)    102 98  F (36.7  C) (Oral) 5' 10.5\" (1.791 m) 07/28/2017 97% 37.63 kg/m2       Blood Pressure from Last 3 Encounters:   08/02/17 136/80   04/05/17 112/74   01/10/17 124/84    Weight from Last 3 Encounters:   08/02/17 266 lb (120.7 kg)   04/05/17 274 lb (124.3 kg)   01/10/17 288 lb 6.4 oz (130.8 kg)              We Performed the Following     Beta HCG qual IFA urine          Today's Medication Changes          These changes are accurate as of: 8/2/17 11:36 AM.  If you have any questions, ask your nurse or doctor.               Start taking these medicines.        Dose/Directions    medroxyPROGESTERone 150 MG/ML injection   Commonly known as:  DEPO-PROVERA   Used for:  Encounter for initial prescription of injectable contraceptive   Started by:  Therese River APRN CNP        Dose:  150 mg   Inject 1 mL (150 mg) into the muscle every 3 months   Quantity:  1 mL   Refills:  3            Where to get your medicines      Some of these will need a paper prescription and others can be bought over the counter.  Ask your nurse if you have questions.     You don't need a prescription for these medications     medroxyPROGESTERone 150 MG/ML injection                Primary Care Provider Office Phone # Fax #    AISHWARYA Mehta [phone]       [address] [address]        Equal Access to Services     WILLIAM HERNANDEZ AH: Kip Witt, wadarnellda luqadaha, qadaniel kaalmada marcida, dg obregon. So [facility] [phone].    ATENCIÓN: Si habla español, tiene a weiner disposición servicios gratuitos de asistencia lingüística. Llame al " [phone].    We comply with applicable federal civil rights laws and [place] laws. We do not discriminate on the basis of race, color, national origin, age, disability sex, sexual orientation or gender identity.            Thank you!     Thank you for choosing [facility]  for your care. Our goal is always to provide you with excellent care. Hearing back from our patients is one way we can continue to improve our services. Please take a few minutes to complete the written survey that you may receive in the mail after your visit with us. Thank you!             Your Updated Medication List - Protect others around you: Learn how to safely use, store and throw away your medicines at www.disposemymeds.org.          This list is accurate as of: 8/2/17 11:36 AM.  Always use your most recent med list.                   Brand Name Dispense Instructions for use Diagnosis    medroxyPROGESTERone 150 MG/ML injection    DEPO-PROVERA    1 mL    Inject 1 mL (150 mg) into the muscle every 3 months    Encounter for initial prescription of injectable contraceptive

## 2017-08-02 NOTE — PATIENT INSTRUCTIONS
[facility]    If you have any questions regarding to your visit please contact your care team:     Team Pink:   Clinic Hours Telephone Number   Internal Medicine:  Dr. Chrissie River NP       7am-7pm  Monday - Thursday   7am-5pm  Fridays  [phone]  (Appointment scheduling available 24/7)    Questions about your visit?  Team Line  [phone]   [facility] [place] [facility] - 11am-9pm Monday-Friday Saturday-Sunday- 9am-5pm   [place] - 5pm-9pm Monday-Friday Saturday-Sunday- 9am-5pm  [phone] - [place]   [phone] - [place]       What options do I have for visits at the clinic other than the traditional office visit?  To expand how we care for you, many of our providers are utilizing electronic visits (e-visits) and telephone visits, when medically appropriate, for interactions with their patients rather than a visit in the clinic.   We also offer nurse visits for many medical concerns. Just like any other service, we will bill your insurance company for this type of visit based on time spent on the phone with your provider. Not all insurance companies cover these visits. Please check with your medical insurance if this type of visit is covered. You will be responsible for any charges that are not paid by your insurance.      E-visits via LeanApps:  generally incur a $35.00 fee.  Telephone visits:  Time spent on the phone: *charged based on time that is spent on the phone in increments of 10 minutes. Estimated cost:   5-10 mins $30.00   11-20 mins. $59.00   21-30 mins. $85.00   Use [employer] (secure email communication and access to your chart) to send your primary care provider a message or make an appointment. Ask someone on your Team how to sign up for LeanApps.    For a Price Quote for your services, please call our Consumer Price Line at [phone].    As always, Thank you for trusting us with your health care  needs!    Discharged by Daphne SANTANA CMA ([facility])

## 2017-08-02 NOTE — NURSING NOTE
The following medication was given:     MEDICATION: Depo Provera 150mg  ROUTE: IM  SITE: Ventrogluteal - Right  DOSE: 1ml  LOT #: [id number]  :  [employer]   EXPIRATION DATE:  12/01/2019  NDC#: [address]-2  Instructed this patient to come back between Oct. 18th through Nov. 1st.   Daphne SANTANA CMA ([facility])

## 2017-08-02 NOTE — NURSING NOTE
"Chief Complaint   Patient presents with     Contraception       Initial /80  Pulse 102  Temp 98  F (36.7  C) (Oral)  Ht 5' 10.5\" (1.791 m)  Wt 266 lb (120.7 kg)  LMP 07/28/2017  SpO2 97%  BMI 37.63 kg/m2 Estimated body mass index is 37.63 kg/(m^2) as calculated from the following:    Height as of this encounter: 5' 10.5\" (1.791 m).    Weight as of this encounter: 266 lb (120.7 kg).  Medication Reconciliation: complete   Daphne SANTANA CMA ([facility])      "

## 2017-12-08 ENCOUNTER — OFFICE VISIT (OUTPATIENT)
Dept: FAMILY MEDICINE | Facility: CLINIC | Age: 22
End: 2017-12-08
Payer: COMMERCIAL

## 2017-12-08 VITALS
TEMPERATURE: 97 F | HEART RATE: 92 BPM | HEIGHT: 71 IN | BODY MASS INDEX: 38.36 KG/M2 | OXYGEN SATURATION: 98 % | DIASTOLIC BLOOD PRESSURE: 72 MMHG | RESPIRATION RATE: 16 BRPM | SYSTOLIC BLOOD PRESSURE: 118 MMHG | WEIGHT: 274 LBS

## 2017-12-08 DIAGNOSIS — Z23 NEED FOR PROPHYLACTIC VACCINATION AND INOCULATION AGAINST INFLUENZA: ICD-10-CM

## 2017-12-08 DIAGNOSIS — Z30.42 ENCOUNTER FOR MANAGEMENT AND INJECTION OF DEPO-PROVERA: Primary | ICD-10-CM

## 2017-12-08 LAB — BETA HCG QUAL IFA URINE: NEGATIVE

## 2017-12-08 PROCEDURE — 96372 THER/PROPH/DIAG INJ SC/IM: CPT | Performed by: NURSE PRACTITIONER

## 2017-12-08 PROCEDURE — 84703 CHORIONIC GONADOTROPIN ASSAY: CPT | Performed by: NURSE PRACTITIONER

## 2017-12-08 PROCEDURE — 99213 OFFICE O/P EST LOW 20 MIN: CPT | Performed by: NURSE PRACTITIONER

## 2017-12-08 PROCEDURE — 90471 IMMUNIZATION ADMIN: CPT | Performed by: NURSE PRACTITIONER

## 2017-12-08 PROCEDURE — 90686 IIV4 VACC NO PRSV 0.5 ML IM: CPT | Performed by: NURSE PRACTITIONER

## 2017-12-08 ASSESSMENT — PAIN SCALES - GENERAL: PAINLEVEL: NO PAIN (0)

## 2017-12-08 NOTE — NURSING NOTE
The following medication was given:     MEDICATION: Depo Provera 150mg  ROUTE: IM  SITE: Vastus Lateralis - Right  DOSE: 1ml  LOT #: [id number]  :  [employer]   EXPIRATION DATE:  02/20/20  NDC#: [address]-5  Instructed patient to come back Feb 23rd through March 9th.   Daphne SANTANA CMA ([facility])

## 2017-12-08 NOTE — PROGRESS NOTES
SUBJECTIVE:   Daphne Pollard is a 22 year old female who presents to clinic today for the following health issues:    Chief Complaint   Patient presents with     Recheck Medication     depo     Patient has a month late to get her Depo Shot.  She denies any side effects from Depo.  She has not had unprotected sex in the past 2 weeks.  She notes her periods are lighter and menstrual cycle is longer on Depo.    Problem list and histories reviewed & adjusted, as indicated.  Additional history: as documented    Patient Active Problem List   Diagnosis     Calculus of gallbladder without cholecystitis without obstruction     Past Surgical History:   Procedure Laterality Date     AS RAD RESEC TONSIL/PILLARS  2002     ENT SURGERY      adenoidectomy       Social History   Substance Use Topics     Smoking status: Former Smoker     Types: Cigarettes     Quit date: 1/10/2015     Smokeless tobacco: Never Used     Alcohol use Yes     Family History   Problem Relation Age of Onset     CEREBROVASCULAR DISEASE Paternal Half-Sister      Hypertension Mother      Coronary Artery Disease Mother      DIABETES Mother      Hypertension Father      Coronary Artery Disease Father      DIABETES Maternal Grandmother      DIABETES Maternal Grandfather      Hypertension Paternal Grandmother      Coronary Artery Disease Paternal Grandmother      Hypertension Paternal Grandfather      Coronary Artery Disease Paternal Grandfather      Depression Sister      Anxiety Disorder Sister          Current Outpatient Prescriptions   Medication Sig Dispense Refill     medroxyPROGESTERone (DEPO-PROVERA) 150 MG/ML injection Inject 1 mL (150 mg) into the muscle every 3 months (Patient not taking: Reported on 12/8/2017) 1 mL 3     No Known Allergies  BP Readings from Last 3 Encounters:   12/08/17 118/72   08/02/17 136/80   04/05/17 112/74    Wt Readings from Last 3 Encounters:   12/08/17 274 lb (124.3 kg)   08/02/17 266 lb (120.7 kg)   04/05/17 274 lb (124.3  "kg)                  Labs reviewed in EPIC        Reviewed and updated as needed this visit by clinical staffTobacco  Allergies  Meds  Med Hx  Surg Hx  Fam Hx  Soc Hx      Reviewed and updated as needed this visit by Provider         ROS:  Constitutional, HEENT, cardiovascular, pulmonary, gi and gu systems are negative, except as otherwise noted.      OBJECTIVE:   /72  Pulse 92  Temp 97  F (36.1  C) (Oral)  Resp 16  Ht 5' 10.5\" (1.791 m)  Wt 274 lb (124.3 kg)  LMP 11/06/2017  SpO2 98%  Breastfeeding? No  BMI 38.76 kg/m2  Body mass index is 38.76 kg/(m^2).  GENERAL: healthy, alert and no distress  MS: no gross musculoskeletal defects noted, no edema    Diagnostic Test Results:  Results for orders placed or performed in visit on 12/08/17 (from the past 24 hour(s))   Beta HCG qual IFA urine - [facility] and [place]   Result Value Ref Range    Beta HCG Qual IFA Urine Negative NEG^Negative          ASSESSMENT/PLAN:     1. Encounter for management and injection of depo-Provera  Patient to follow-up with ancillary for injection in the future.  - Beta HCG qual IFA urine - [facility]    FUTURE APPOINTMENTS:       - Follow-up for annual visit or as needed    AISHWARYA Gonzalez CNP  [facility]  "

## 2017-12-08 NOTE — PATIENT INSTRUCTIONS
[facility]    If you have any questions regarding to your visit please contact your care team:     Team Pink:   Clinic Hours Telephone Number   Internal Medicine:  Dr. Chrissie River NP       7am-7pm  Monday - Thursday   7am-5pm  Fridays  [phone]  (Appointment scheduling available 24/7)    Questions about your visit?  Team Line  [phone]   Urgent [facility] - [place] [facility] - 11am-9pm Monday-Friday Saturday-Sunday- 9am-5pm   [place] - 5pm-9pm Monday-Friday Saturday-Sunday- 9am-5pm  [phone] - [place]   [phone] - [place]       What options do I have for visits at the clinic other than the traditional office visit?  To expand how we care for you, many of our providers are utilizing electronic visits (e-visits) and telephone visits, when medically appropriate, for interactions with their patients rather than a visit in the clinic.   We also offer nurse visits for many medical concerns. Just like any other service, we will bill your insurance company for this type of visit based on time spent on the phone with your provider. Not all insurance companies cover these visits. Please check with your medical insurance if this type of visit is covered. You will be responsible for any charges that are not paid by your insurance.      E-visits via beRecruited:  generally incur a $35.00 fee.  Telephone visits:  Time spent on the phone: *charged based on time that is spent on the phone in increments of 10 minutes. Estimated cost:   5-10 mins $30.00   11-20 mins. $59.00   21-30 mins. $85.00   Use Ekot (secure email communication and access to your chart) to send your primary care provider a message or make an appointment. Ask someone on your Team how to sign up for beRecruited.    For a Price Quote for your services, please call our Consumer Price Line at [phone].    As always, Thank you for trusting us with your health care  needs!    Discharged by Daphne SANTANA CMA ([facility])

## 2017-12-08 NOTE — MR AVS SNAPSHOT
After Visit Summary   12/8/2017    Daphne Pollard    MRN: [id number]           Patient Information     Date Of Birth          1995        Visit Information        Provider Department      12/8/2017 11:00 AM Therese River APRN [facility]        Today's Diagnoses     Encounter for management and injection of depo-Provera    -  1      Care Instructions    [facility]    If you have any questions regarding to your visit please contact your care team:     Team Pink:   Clinic Hours Telephone Number   Internal Medicine:  Dr. Chrissie River, NP       7am-7pm  Monday - Thursday   7am-5pm  Fridays  [phone]  (Appointment scheduling available 24/7)    Questions about your visit?  Team Line  [phone]   Urgent Care - [place] and [place] - 11am-9pm Monday-Friday Saturday-Sunday- 9am-5pm   [place] - 5pm-9pm Monday-Friday Saturday-Sunday- 9am-5pm  [phone] - [place]   [phone] - [place]       What options do I have for visits at the clinic other than the traditional office visit?  To expand how we care for you, many of our providers are utilizing electronic visits (e-visits) and telephone visits, when medically appropriate, for interactions with their patients rather than a visit in the clinic.   We also offer nurse visits for many medical concerns. Just like any other service, we will bill your insurance company for this type of visit based on time spent on the phone with your provider. Not all insurance companies cover these visits. Please check with your medical insurance if this type of visit is covered. You will be responsible for any charges that are not paid by your insurance.      E-visits via HowDo:  generally incur a $35.00 fee.  Telephone visits:  Time spent on the phone: *charged based on time that is spent on the phone in increments of 10 minutes. Estimated cost:   5-10 mins  "$30.00   11-20 mins. $59.00   21-30 mins. $85.00   Use Ross[employer] (secure email communication and access to your chart) to send your primary care provider a message or make an appointment. Ask someone on your Team how to sign up for Rossolinihart.    For a Price Quote for your services, please call our Consumer Price Line at [phone].    As always, Thank you for trusting us with your health care needs!    Discharged by Daphne SANTANA CMA ([facility])            Follow-ups after your visit        Who to contact     If you have questions or need follow up information about today's clinic visit or your schedule please contact [facility] directly at [phone].  Normal or non-critical lab and imaging results will be communicated to you by Rossolinihart, letter or phone within 4 business days after the clinic has received the results. If you do not hear from us within 7 days, please contact the clinic through Rossolinihart or phone. If you have a critical or abnormal lab result, we will notify you by phone as soon as possible.  Submit refill requests through AdEspresso or call your pharmacy and they will forward the refill request to us. Please allow 3 business days for your refill to be completed.          Additional Information About Your Visit        AdEspresso Information     AdEspresso lets you send messages to your doctor, view your test results, renew your prescriptions, schedule appointments and more. To sign up, go to [email or web address] . Click on \"Log in\" on the left side of the screen, which will take you to the Welcome page. Then click on \"Sign up Now\" on the right side of the page.     You will be asked to enter the access code listed below, as well as some personal information. Please follow the directions to create your username and password.     Your access code is: [email or web address]  Expires: 3/8/2018 11:34 AM     Your access code will  in 90 days. If you need help or a new code, please call your [facility] or " "[phone].        Care EveryWhere ID     This is your Care EveryWhere ID. This could be used by other organizations to access your [place] medical records  [id number]        Your Vitals Were     Pulse Temperature Respirations Height Last Period Pulse Oximetry    92 97  F (36.1  C) (Oral) 16 5' 10.5\" (1.791 m) 11/06/2017 98%    Breastfeeding? BMI (Body Mass Index)                No 38.76 kg/m2           Blood Pressure from Last 3 Encounters:   12/08/17 118/72   08/02/17 136/80   04/05/17 112/74    Weight from Last 3 Encounters:   12/08/17 274 lb (124.3 kg)   08/02/17 266 lb (120.7 kg)   04/05/17 274 lb (124.3 kg)              We Performed the Following     Beta HCG qual [facility] urine - [facility] and [place]        Primary Care Provider Office Phone # Fax #    Therese AISHWARYA [facility] [phone]       [address] [address]        Equal Access to Services     [facility]: Hadii aad ku hadasho Soomaali, waaxda luqadaha, qaybta kaalmada adeegyada, dg turpin . So [facility] [phone].    ATENCIÓN: Si habla español, tiene a weinre disposición servicios gratuitos de asistencia lingüística. [facility] [phone].    We comply with applicable federal civil rights laws and [place] laws. We do not discriminate on the basis of race, color, national origin, age, disability, sex, sexual orientation, or gender identity.            Thank you!     Thank you for choosing [facility]  for your care. Our goal is always to provide you with excellent care. Hearing back from our patients is one way we can continue to improve our services. Please take a few minutes to complete the written survey that you may receive in the mail after your visit with us. Thank you!             Your Updated Medication List - Protect others around you: Learn how to safely use, store and throw away your medicines at www.disposemymeds.org.          This list is accurate as of: " 12/8/17 11:34 AM.  Always use your most recent med list.                   Brand Name Dispense Instructions for use Diagnosis    medroxyPROGESTERone 150 MG/ML injection    DEPO-PROVERA    1 mL    Inject 1 mL (150 mg) into the muscle every 3 months    Encounter for initial prescription of injectable contraceptive

## 2017-12-08 NOTE — NURSING NOTE
"Chief Complaint   Patient presents with     Recheck Medication     depo       Initial /72  Pulse 92  Temp 97  F (36.1  C) (Oral)  Resp 16  Ht 5' 10.5\" (1.791 m)  Wt 274 lb (124.3 kg)  LMP 11/06/2017  SpO2 98%  Breastfeeding? No  BMI 38.76 kg/m2 Estimated body mass index is 38.76 kg/(m^2) as calculated from the following:    Height as of this encounter: 5' 10.5\" (1.791 m).    Weight as of this encounter: 274 lb (124.3 kg).  Medication Reconciliation: complete   Estella Hwang CMA ([facility])      "

## 2017-12-08 NOTE — PROGRESS NOTES

## 2018-05-15 ENCOUNTER — ALLIED HEALTH/NURSE VISIT (OUTPATIENT)
Dept: NURSING | Facility: CLINIC | Age: 23
End: 2018-05-15
Payer: COMMERCIAL

## 2018-05-15 VITALS
WEIGHT: 288 LBS | HEART RATE: 106 BPM | TEMPERATURE: 99.3 F | OXYGEN SATURATION: 100 % | BODY MASS INDEX: 40.32 KG/M2 | SYSTOLIC BLOOD PRESSURE: 114 MMHG | DIASTOLIC BLOOD PRESSURE: 72 MMHG | HEIGHT: 71 IN | RESPIRATION RATE: 15 BRPM

## 2018-05-15 LAB — BETA HCG QUAL IFA URINE: NEGATIVE

## 2018-05-15 PROCEDURE — 96372 THER/PROPH/DIAG INJ SC/IM: CPT

## 2018-05-15 PROCEDURE — 84703 CHORIONIC GONADOTROPIN ASSAY: CPT | Performed by: NURSE PRACTITIONER

## 2018-05-15 PROCEDURE — 99207 ZZC NO CHARGE NURSE ONLY: CPT

## 2018-05-15 NOTE — PROGRESS NOTES
Follow Up Injection    Patient returning during stated date range given at previous visit: No, urine pregnancy test performed, results (neg - injection administered, positive - injection deferred)      If here at the correct interval:   BP Readings from Last 1 Encounters:   12/08/17 118/72     Wt Readings from Last 1 Encounters:   12/08/17 274 lb (124.3 kg)       Last Pap/exam date: 1/10/2017      Side effects or problems with last injection?  No.  Date range is given to patient for next dose: 7/31 to 8/14/18    See Medication Note for administration information    Staff Sig: Abbie Daniel. MA

## 2018-05-15 NOTE — LETTER
[facility]  [address] [address]    May 15, 2018    Daphne Pollard  [address]   [place] [address]      Dear Daphne,    Negative urine pregnancy test.     Enclosed is a copy of your results.   Results for orders placed or performed in visit on 05/15/18   Beta HCG qual IFA urine   Result Value Ref Range    Beta HCG Qual IFA Urine Negative NEG^Negative          If you have any questions or concerns, please call myself or my nurse at [phone].    Sincerely,    Therese River CNP/gaby

## 2018-05-15 NOTE — MR AVS SNAPSHOT
"              After Visit Summary   5/15/2018    Daphne Pollard    MRN: [id number]           Patient Information     Date Of Birth          1995        Visit Information        Provider Department      5/15/2018 1:30 PM [facility] ANCILLARY [facility]        Today's Diagnoses     Contraception    -  1       Follow-ups after your visit        Who to contact     If you have questions or need follow up information about today's clinic visit or your schedule please contact [facility] directly at [phone].  Normal or non-critical lab and imaging results will be communicated to you by MyChart, letter or phone within 4 business days after the clinic has received the results. If you do not hear from us within 7 days, please contact the clinic through [employer] Mar[employer] Material[employer]art or phone. If you have a critical or abnormal lab result, we will notify you by phone as soon as possible.  Submit refill requests through [employer] or call your pharmacy and they will forward the refill request to us. Please allow 3 business days for your refill to be completed.          Additional Information About Your Visit        MyChart Information     [employer] lets you send messages to your doctor, view your test results, renew your prescriptions, schedule appointments and more. To sign up, go to [email or web address] . Click on \"Log in\" on the left side of the screen, which will take you to the Welcome page. Then click on \"Sign up Now\" on the right side of the page.     You will be asked to enter the access code listed below, as well as some personal information. Please follow the directions to create your username and password.     Your access code is: [email or web address]  Expires: 2018 11:18 AM     Your access code will  in 90 days. If you need help or a new code, please call your [facility] or [phone].        Care EveryWhere ID     This is your Care EveryWhere ID. This could be used by other organizations to access " "your [place] medical records  [id number]        Your Vitals Were     Pulse Temperature Respirations Height Pulse Oximetry BMI (Body Mass Index)    106 99.3  F (37.4  C) 15 5' 10.5\" (1.791 m) 100% 40.74 kg/m2       Blood Pressure from Last 3 Encounters:   05/15/18 114/72   12/08/17 118/72   08/02/17 136/80    Weight from Last 3 Encounters:   05/15/18 288 lb (130.6 kg)   12/08/17 274 lb (124.3 kg)   08/02/17 266 lb (120.7 kg)              We Performed the Following     Beta HCG qual IFA urine        Primary Care Provider Office Phone # Fax #    Therese HillAISHWARYA [facility] [phone]       [address] [address]        Equal Access to Services     [facility]: Hadii aad ku hadasho Soomaali, waaxda luqadaha, qaybta kaalmada adeegyada, waxay idiin hayaadaiana corey kharaneris turpin . So [facility] [phone].    ATENCIÓN: Si habla español, tiene a weiner disposición servicios gratuitos de asistencia lingüística. Girish al [phone].    We comply with applicable federal civil rights laws and [place] laws. We do not discriminate on the basis of race, color, national origin, age, disability, sex, sexual orientation, or gender identity.            Thank you!     Thank you for choosing [facility]  for your care. Our goal is always to provide you with excellent care. Hearing back from our patients is one way we can continue to improve our services. Please take a few minutes to complete the written survey that you may receive in the mail after your visit with us. Thank you!             Your Updated Medication List - Protect others around you: Learn how to safely use, store and throw away your medicines at www.disposemymeds.org.          This list is accurate as of 5/15/18  3:10 PM.  Always use your most recent med list.                   Brand Name Dispense Instructions for use Diagnosis    medroxyPROGESTERone 150 MG/ML injection    DEPO-PROVERA    1 mL    Inject 1 mL (150 mg) " into the muscle every 3 months    Encounter for initial prescription of injectable contraceptive

## 2018-05-15 NOTE — NURSING NOTE
The following medication was given:     MEDICATION: Depo Provera 150mg  ROUTE: IM  SITE: Ventrogluteal - Left  DOSE: 1 ml  LOT #: [id number]  :  [employer]   EXPIRATION DATE:  04/2020  NDC#: [address]-6  Next injection due on  7/31 to 8/14/18. Card given to patient with dates  Abbie Bacon MA

## 2018-05-15 NOTE — PROGRESS NOTES
Dear Daphne,    Your recent test results are attached.      Negative urine pregnancy test.    If you have any questions please feel free to contact [phone] or myself via Slidebeant.    Sincerely,  Therese River, CNP

## 2018-07-13 ENCOUNTER — E-VISIT (OUTPATIENT)
Dept: FAMILY MEDICINE | Facility: CLINIC | Age: 23
End: 2018-07-13
Payer: COMMERCIAL

## 2018-07-13 DIAGNOSIS — R63.5 WEIGHT GAIN: Primary | ICD-10-CM

## 2018-09-21 ENCOUNTER — OFFICE VISIT (OUTPATIENT)
Dept: FAMILY MEDICINE | Facility: CLINIC | Age: 23
End: 2018-09-21
Payer: COMMERCIAL

## 2018-09-21 VITALS
HEART RATE: 92 BPM | DIASTOLIC BLOOD PRESSURE: 64 MMHG | OXYGEN SATURATION: 99 % | RESPIRATION RATE: 16 BRPM | TEMPERATURE: 98.1 F | WEIGHT: 293 LBS | BODY MASS INDEX: 43.14 KG/M2 | SYSTOLIC BLOOD PRESSURE: 118 MMHG

## 2018-09-21 DIAGNOSIS — E66.01 MORBID OBESITY (H): Primary | ICD-10-CM

## 2018-09-21 PROCEDURE — 99213 OFFICE O/P EST LOW 20 MIN: CPT | Performed by: FAMILY MEDICINE

## 2018-09-21 NOTE — MR AVS SNAPSHOT
After Visit Summary   9/21/2018    Daphne Pollard    MRN: [id number]           Patient Information     Date Of Birth          1995        Visit Information        Provider Department      9/21/2018 1:30 PM Donnie Will MD [facility]        Today's Diagnoses     Morbid obesity (H)    -  1       Follow-ups after your visit        Additional Services     BARIATRIC ADULT REFERRAL       Your provider has referred you to: [facility]: [facility] [phone]  [email or web address]    Please be aware that coverage of these services is subject to the terms and limitations of your health insurance plan.  Call member services at your health plan with any benefit or coverage questions.      Please bring the following with you to your appointment:      (1) List of current medications   (2) This referral request   (3) Any documents/labs given to you for this referral                  Future tests that were ordered for you today     Open Future Orders        Priority Expected Expires Ordered    TSH with free T4 reflex Routine  10/27/2018 9/21/2018    Glucose Routine  10/27/2018 9/21/2018            Who to contact     If you have questions or need follow up information about today's clinic visit or your schedule please contact [facility] directly at [phone].  Normal or non-critical lab and imaging results will be communicated to you by MyChart, letter or phone within 4 business days after the clinic has received the results. If you do not hear from us within 7 days, please contact the clinic through MyChart or phone. If you have a critical or abnormal lab result, we will notify you by phone as soon as possible.  Submit refill requests through [email or web address] or call your pharmacy and they will forward the refill  request to us. Please allow 3 business days for your refill to be completed.          Additional Information About Your Visit        MyChart Information     [employer] gives you secure access to your electronic health record. If you see a primary care provider, you can also send messages to your care team and make appointments. If you have questions, please call your primary care clinic.  If you do not have a primary care provider, please call [phone] and they will assist you.        Care EveryWhere ID     This is your Care EveryWhere ID. This could be used by other organizations to access your [place] medical records  [id number]        Your Vitals Were     Pulse Temperature Respirations Pulse Oximetry BMI (Body Mass Index)       92 98.1  F (36.7  C) (Tympanic) 16 99% 43.14 kg/m2        Blood Pressure from Last 3 Encounters:   09/21/18 118/64   05/15/18 114/72   12/08/17 118/72    Weight from Last 3 Encounters:   09/21/18 305 lb (138.3 kg)   05/15/18 288 lb (130.6 kg)   12/08/17 274 lb (124.3 kg)              We Performed the Following     BARIATRIC ADULT REFERRAL        Primary Care Provider Office Phone # Fax #    Therese Elena River, AISHWARYA [facility] [phone]       [address] [facility] [facility] [address]        Equal Access to Services     WILLIAM HERNANDEZ : Hadii aad ku hadasho Soomaali, waaxda luqadaha, qaybta kaalmada adeegyada, waxay idiin hayaan madhav turpin . So [facility] [phone].    ATENCIÓN: Si habla español, tiene a weiner disposición servicios gratuitos de asistencia lingüística. Llame al [phone].    We comply with applicable federal civil rights laws and [place] laws. We do not discriminate on the basis of race, color, national origin, age, disability, sex, sexual orientation, or gender identity.            Thank you!     Thank you for choosing [facility]  for your care. Our goal is always to provide you with excellent care. Hearing back  from our patients is one way we can continue to improve our services. Please take a few minutes to complete the written survey that you may receive in the mail after your visit with us. Thank you!             Your Updated Medication List - Protect others around you: Learn how to safely use, store and throw away your medicines at www.disposemymeds.org.          This list is accurate as of 9/21/18  2:01 PM.  Always use your most recent med list.                   Brand Name Dispense Instructions for use Diagnosis    medroxyPROGESTERone 150 MG/ML injection    DEPO-PROVERA    1 mL    Inject 1 mL (150 mg) into the muscle every 3 months    Encounter for initial prescription of injectable contraceptive

## 2018-09-21 NOTE — PROGRESS NOTES
SUBJECTIVE:   Daphne Pollard is a 22 year old female who presents to clinic today for the following health issues:      Concern - weight gain  Onset: worse in college    Description:   Has been gaining weight, would like to get it under control    Intensity: moderate    Progression of Symptoms:  worse    Accompanying Signs & Symptoms:  Family history of heart disease, weight issues     Previous history of similar problem:   yes    Precipitating factors:   Worsened by: na    Alleviating factors:  Improved by: na    Therapies Tried and outcome: trying to watch what she eats        Problem list and histories reviewed & adjusted, as indicated.  Additional history: Both parents have had weight issues their adult lives    Patient Active Problem List   Diagnosis     Calculus of gallbladder without cholecystitis without obstruction     Morbid obesity (H)     Past Surgical History:   Procedure Laterality Date     AS RAD RESEC TONSIL/PILLARS  2002     ENT SURGERY      adenoidectomy       Social History   Substance Use Topics     Smoking status: Never Smoker     Smokeless tobacco: Never Used     Alcohol use Yes     Family History   Problem Relation Age of Onset     Cerebrovascular Disease Paternal Half-Sister      Hypertension Mother      Coronary Artery Disease Mother      Diabetes Mother      Hypertension Father      Coronary Artery Disease Father      Diabetes Maternal Grandmother      Diabetes Maternal Grandfather      Hypertension Paternal Grandmother      Coronary Artery Disease Paternal Grandmother      Hypertension Paternal Grandfather      Coronary Artery Disease Paternal Grandfather      Depression Sister      Anxiety Disorder Sister            Reviewed and updated as needed this visit by clinical staff  Tobacco  Allergies  Meds  Med Hx  Surg Hx  Fam Hx  Soc Hx      Reviewed and updated as needed this visit by Provider         ROS:  Constitutional, HEENT, cardiovascular, pulmonary, gi and gu systems are  negative, except as otherwise noted.    OBJECTIVE:                                                    /64 (Cuff Size: Adult Large)  Pulse 92  Temp 98.1  F (36.7  C) (Tympanic)  Resp 16  Wt 305 lb (138.3 kg)  SpO2 99%  BMI 43.14 kg/m2  Body mass index is 43.14 kg/(m^2).  GENERAL APPEARANCE: healthy, alert, no distress and Nourishment morbidly obese          ASSESSMENT/PLAN:                                                        ICD-10-CM    1. Morbid obesity (H) E66.01 BARIATRIC ADULT REFERRAL     TSH with free T4 reflex     Glucose     Return in about 6 months (around 3/21/2019) for Weight.  Patient Instructions   I referred the patient to the weight loss clinic in [place].  We will check blood sugar and thyroid.        Donnie Will MD  [facility]

## 2018-09-21 NOTE — PATIENT INSTRUCTIONS
I referred the patient to the weight loss clinic in [place].  We will check blood sugar and thyroid.

## 2018-10-16 DIAGNOSIS — E66.01 MORBID OBESITY (H): ICD-10-CM

## 2018-10-16 LAB
GLUCOSE SERPL-MCNC: 108 MG/DL (ref 70–99)
TSH SERPL DL<=0.005 MIU/L-ACNC: 2.1 MU/L (ref 0.4–4)

## 2018-10-16 PROCEDURE — 82947 ASSAY GLUCOSE BLOOD QUANT: CPT | Performed by: FAMILY MEDICINE

## 2018-10-16 PROCEDURE — 36415 COLL VENOUS BLD VENIPUNCTURE: CPT | Performed by: FAMILY MEDICINE

## 2018-10-16 PROCEDURE — 84443 ASSAY THYROID STIM HORMONE: CPT | Performed by: FAMILY MEDICINE

## 2019-12-09 ENCOUNTER — HEALTH MAINTENANCE LETTER (OUTPATIENT)
Age: 24
End: 2019-12-09

## 2020-03-15 ENCOUNTER — HEALTH MAINTENANCE LETTER (OUTPATIENT)
Age: 25
End: 2020-03-15

## 2020-08-18 ENCOUNTER — VIRTUAL VISIT (OUTPATIENT)
Dept: FAMILY MEDICINE | Facility: OTHER | Age: 25
End: 2020-08-18
Payer: COMMERCIAL

## 2020-08-18 PROCEDURE — 99421 OL DIG E/M SVC 5-10 MIN: CPT | Performed by: PHYSICIAN ASSISTANT

## 2020-08-18 NOTE — PROGRESS NOTES
"Date: 2020 12:18:24  Clinician: Bruce Smith  Clinician NPI: [id number]  Patient: Daphne Pollard  Patient : 1995  Patient Address: [address] [place] [address]  Patient Phone: [phone]  Visit Protocol: URI  Patient Summary:  Daphne is a 24 year old ( : 1995 ) female who initiated a Visit for COVID-19 (Coronavirus) evaluation and screening. When asked the question \"Please sign me up to receive news, health information and promotions from [facility].\", Daphne responded \"No\".    When asked when her symptoms started, Daphne reported that she does not have any symptoms.   She denies taking antibiotic medication in the past month and having recent facial or sinus surgery in the past 60 days.    Pertinent COVID-19 (Coronavirus) information  In the past 14 days, Daphne has not worked in a congregate living setting.   She does not work or volunteer as healthcare worker or a  and does not work or volunteer in a healthcare facility.   Daphne also has not lived in a congregate living setting in the past 14 days. She does not live with a healthcare worker.   Daphne has not had a close contact with a laboratory-confirmed COVID-19 patient within the last 14 days.   Since 2019, Daphne and has not had upper respiratory infection or influenza-like illness. Has not been diagnosed with lab-confirmed COVID-19 test   Pertinent medical history  Daphne does not get yeast infections when she takes antibiotics.   Daphne needs a return to work/school note.   Weight: 350 lbs   Daphne does not smoke or use smokeless tobacco.   She denies pregnancy and denies breastfeeding. Her last period was over a month ago.   Weight: 350 lbs    MEDICATIONS: No current medications, ALLERGIES: NKDA  Clinician Response:  Dear Daphne,   Based on the details you've shared, you may have been exposed to coronavirus (COVID-19). You do not need to be tested at this time.  What should I do?  For safety, it's very " important to follow these rules. Do this for 14 days after the date you were last exposed to the virus.  Stay home and away from others. Don't go to work, school or anywhere else.  No hugging, kissing or shaking hands.  Don't let anyone visit.  Cover your mouth and nose with a mask, tissue or washcloth to avoid spreading germs.  Wash your hands and face often. Use soap and water.  What are the symptoms of COVID-19?  The most common symptoms are cough, fever and trouble breathing. Less common symptoms include headache, body aches, fatigue (feeling very tired), chills, sore throat, stuffy or runny nose, diarrhea (loose poop), loss of taste or smell, belly pain, and nausea or vomiting (feeling sick to your stomach or throwing up).  After 14 days, if you have still don't have symptoms, you likely don't have this virus.  If you develop symptoms, follow these guidelines.  If you're normally healthy: Please start another [facility] visit to report your symptoms. Go to [email or web address].  If you have a serious health problem (like cancer, heart failure, an organ transplant or kidney disease): Call your specialty clinic. Let them know that you might have COVID-19.  Where can I get more information?   [facility] -- About COVID-19: [email or web address].[email or web address]/  CDC -- What to Do If You're Sick: www.cdc.gov/coronavirus/2019-ncov/about/steps-when-sick.html  CDC -- Ending Home Isolation: www.cdc.gov/coronavirus/2019-ncov/hcp/disposition-in-home-patients.html  CDC -- Caring for Someone: www.cdc.gov/coronavirus/2019-ncov/if-you-are-sick/care-for-someone.html  [facility] -- Interim Guidance for Hospital Discharge to Home: www.health.[facility].[place]/diseases/coronavirus/hcp/hospdischarge.pdf  [facility] clinical trials (COVID-19 research studies): clinicalaffairs.[facility]/umn-clinical-trials  Below are the COVID-19 hotlines at the [place] Department of Health ([facility]). Interpreters are available.   For health questions: Call  [phone] or 1-641.742.7302 (7 a.m. to 7 p.m.) For questions about schools and childcare: Call 507-241-5787 or 1-731.605.8144 (7 a.m. to 7 p.m.)     .      Diagnosis: Worries  Diagnosis ICD: R45.82

## 2021-01-14 ENCOUNTER — HEALTH MAINTENANCE LETTER (OUTPATIENT)
Age: 26
End: 2021-01-14

## 2021-08-31 ENCOUNTER — OFFICE VISIT (OUTPATIENT)
Dept: FAMILY MEDICINE | Facility: CLINIC | Age: 26
End: 2021-08-31
Payer: COMMERCIAL

## 2021-08-31 VITALS
WEIGHT: 288.6 LBS | SYSTOLIC BLOOD PRESSURE: 122 MMHG | DIASTOLIC BLOOD PRESSURE: 86 MMHG | HEIGHT: 71 IN | HEART RATE: 84 BPM | BODY MASS INDEX: 40.4 KG/M2 | TEMPERATURE: 99.7 F | OXYGEN SATURATION: 98 %

## 2021-08-31 DIAGNOSIS — F32.1 CURRENT MODERATE EPISODE OF MAJOR DEPRESSIVE DISORDER WITHOUT PRIOR EPISODE (H): Primary | ICD-10-CM

## 2021-08-31 DIAGNOSIS — L91.8 SKIN TAG: ICD-10-CM

## 2021-08-31 DIAGNOSIS — Z30.09 GENERAL COUNSELING AND ADVICE FOR CONTRACEPTIVE MANAGEMENT: ICD-10-CM

## 2021-08-31 DIAGNOSIS — F41.9 ANXIETY: ICD-10-CM

## 2021-08-31 PROCEDURE — 99214 OFFICE O/P EST MOD 30 MIN: CPT | Performed by: NURSE PRACTITIONER

## 2021-08-31 RX ORDER — SERTRALINE HYDROCHLORIDE 25 MG/1
TABLET, FILM COATED ORAL
Qty: 42 TABLET | Refills: 0 | Status: SHIPPED | OUTPATIENT
Start: 2021-08-31 | End: 2021-09-28

## 2021-08-31 ASSESSMENT — PATIENT HEALTH QUESTIONNAIRE - PHQ9
SUM OF ALL RESPONSES TO PHQ QUESTIONS 1-9: 19
5. POOR APPETITE OR OVEREATING: MORE THAN HALF THE DAYS

## 2021-08-31 ASSESSMENT — ANXIETY QUESTIONNAIRES
GAD7 TOTAL SCORE: 11
6. BECOMING EASILY ANNOYED OR IRRITABLE: NEARLY EVERY DAY
2. NOT BEING ABLE TO STOP OR CONTROL WORRYING: MORE THAN HALF THE DAYS
3. WORRYING TOO MUCH ABOUT DIFFERENT THINGS: SEVERAL DAYS
IF YOU CHECKED OFF ANY PROBLEMS ON THIS QUESTIONNAIRE, HOW DIFFICULT HAVE THESE PROBLEMS MADE IT FOR YOU TO DO YOUR WORK, TAKE CARE OF THINGS AT HOME, OR GET ALONG WITH OTHER PEOPLE: VERY DIFFICULT
7. FEELING AFRAID AS IF SOMETHING AWFUL MIGHT HAPPEN: SEVERAL DAYS
5. BEING SO RESTLESS THAT IT IS HARD TO SIT STILL: NOT AT ALL
1. FEELING NERVOUS, ANXIOUS, OR ON EDGE: MORE THAN HALF THE DAYS

## 2021-08-31 ASSESSMENT — MIFFLIN-ST. JEOR: SCORE: 2152.46

## 2021-08-31 ASSESSMENT — PAIN SCALES - GENERAL: PAINLEVEL: NO PAIN (0)

## 2021-08-31 NOTE — PROGRESS NOTES
"    Assessment & Plan     Current moderate episode of major depressive disorder without prior episode (H)  Patient to start sertraline.  Continue with counseling.  - sertraline (ZOLOFT) 25 MG tablet; Take 1 tablet (25 mg) by mouth daily for 14 days, THEN 2 tablets (50 mg) daily for 14 days.    Anxiety  As above.   - sertraline (ZOLOFT) 25 MG tablet; Take 1 tablet (25 mg) by mouth daily for 14 days, THEN 2 tablets (50 mg) daily for 14 days.    General counseling and advice for contraceptive management  Patient to follow-up with Aby Brantley CNP for Mirena IUD placement.    Skin tag  Patient reassured that lesion is benign.      Prescription drug management         BMI:   Estimated body mass index is 40.09 kg/m  as calculated from the following:    Height as of this encounter: 1.807 m (5' 11.14\").    Weight as of this encounter: 130.9 kg (288 lb 9.6 oz).       Depression Screening Follow Up    PHQ 8/31/2021   PHQ-9 Total Score 19   Q9: Thoughts of better off dead/self-harm past 2 weeks Not at all         Follow Up Actions Taken  Follow up recommended: start sertraline, follow-up in 1 month         Return in about 4 weeks (around 9/28/2021) for Medication Recheck- sertraline, Virtual Visit.    AISHWARYA Gonzalez CNP  [facility] TESFAYE Serna is a 25 year old who presents for the following health issues     HPI   Chief Complaint   Patient presents with     Mole     check mole on nose     Contraception     discuss options     MOOD CHANGES     depression         Abnormal Mood Symptoms  Onset/Duration: 3-4 months  Description:   Depression (if yes, do PHQ-9): YES  Anxiety (if yes, do KATHIA-7): YES  Accompanying Signs & Symptoms:  Still participating in activities that you used to enjoy: YES  Fatigue: YES  Irritability: YES  Difficulty concentrating: no  Changes in appetite: YES  Problems with sleep: YES  Heart racing/beating fast: YES  Abnormally elevated, expansive, or irritable mood: " "no  Persistently increased activity or energy: no  Thoughts of hurting yourself or others: no  History:  Recent stress or major life event: no  Prior depression or anxiety: yes but never been treated with medication talked with therapist in college.   Family history of depression or anxiety: YES  Alcohol/drug use: YES- 2 drinks maybe once per week  Difficulty sleeping: YES  Precipitating or alleviating factors: None  Therapies tried and outcome: took a break from work but has not helped. Talked with therapist from college a few times and that seem to help a little.   PHQ 8/31/2021   PHQ-9 Total Score 19   Q9: Thoughts of better off dead/self-harm past 2 weeks Not at all     KATHIA-7 SCORE 8/31/2021   Total Score 11        Patient reports that it's impossible to get things done.  She feels that the quality of her work is slipping.  She is a massage therapist.  She is not cleaning as much and finds it difficult to grocery shop.  She has limited her social interactions.  It's hard to go to work.    Interested in contraception.  Previously on depo provera.    Concerned about skin lesion to her left nare.  Has been present for a long time.  No significant changes.    Review of Systems   Constitutional, HEENT, cardiovascular, pulmonary, gi and gu systems are negative, except as otherwise noted.      Objective    /86   Pulse 84   Temp 99.7  F (37.6  C) (Oral)   Ht 1.807 m (5' 11.14\")   Wt 130.9 kg (288 lb 9.6 oz)   SpO2 98%   BMI 40.09 kg/m    Body mass index is 40.09 kg/m .  Physical Exam   GENERAL: healthy, alert and no distress  RESP: lungs clear to auscultation - no rales, rhonchi or wheezes  CV: regular rate and rhythm, normal S1 S2, no S3 or S4, no murmur, click or rub, no peripheral edema and peripheral pulses strong  MS: no gross musculoskeletal defects noted, no edema  SKIN: skin tag left nare  PSYCH: mentation appears normal, anxious, judgement and insight intact and appearance well groomed            "

## 2021-09-01 ASSESSMENT — ANXIETY QUESTIONNAIRES: GAD7 TOTAL SCORE: 11

## 2021-09-22 ENCOUNTER — OFFICE VISIT (OUTPATIENT)
Dept: FAMILY MEDICINE | Facility: CLINIC | Age: 26
End: 2021-09-22
Payer: COMMERCIAL

## 2021-09-22 VITALS
BODY MASS INDEX: 40.01 KG/M2 | OXYGEN SATURATION: 99 % | TEMPERATURE: 99.6 F | DIASTOLIC BLOOD PRESSURE: 85 MMHG | WEIGHT: 288 LBS | HEART RATE: 89 BPM | SYSTOLIC BLOOD PRESSURE: 126 MMHG

## 2021-09-22 DIAGNOSIS — A74.9 CHLAMYDIA INFECTION: ICD-10-CM

## 2021-09-22 DIAGNOSIS — Z30.430 ENCOUNTER FOR INSERTION OF INTRAUTERINE CONTRACEPTIVE DEVICE: Primary | ICD-10-CM

## 2021-09-22 DIAGNOSIS — Z12.4 SCREENING FOR CERVICAL CANCER: ICD-10-CM

## 2021-09-22 DIAGNOSIS — Z11.3 SCREEN FOR STD (SEXUALLY TRANSMITTED DISEASE): ICD-10-CM

## 2021-09-22 DIAGNOSIS — Z11.8 SPECIAL SCREENING EXAMINATION FOR CHLAMYDIAL DISEASE: ICD-10-CM

## 2021-09-22 LAB — HCG UR QL: NEGATIVE

## 2021-09-22 PROCEDURE — 58300 INSERT INTRAUTERINE DEVICE: CPT | Performed by: NURSE PRACTITIONER

## 2021-09-22 PROCEDURE — 81025 URINE PREGNANCY TEST: CPT | Performed by: NURSE PRACTITIONER

## 2021-09-22 PROCEDURE — 87591 N.GONORRHOEAE DNA AMP PROB: CPT | Performed by: NURSE PRACTITIONER

## 2021-09-22 PROCEDURE — 87491 CHLMYD TRACH DNA AMP PROBE: CPT | Performed by: NURSE PRACTITIONER

## 2021-09-22 PROCEDURE — G0145 SCR C/V CYTO,THINLAYER,RESCR: HCPCS | Performed by: NURSE PRACTITIONER

## 2021-09-22 PROCEDURE — 99213 OFFICE O/P EST LOW 20 MIN: CPT | Mod: 25 | Performed by: NURSE PRACTITIONER

## 2021-09-22 NOTE — PROGRESS NOTES
IUD Insertion:  CONSULT:    Is a pregnancy test required: Yes.  Was it positive or negative?  Negative  Was a consent obtained?  Yes    Subjective: Daphne Pollard is a 25 year old No obstetric history on file. presents for IUD and desires Mirena type IUD.    Patient has been given the opportunity to ask questions about all forms of birth control, including all options appropriate for Daphne Pollard. Discussed that no method of birth control, except abstinence is 100% effective against pregnancy or sexually transmitted infection.     Daphne Pollard understands she may have the IUD removed at any time. IUD should be removed by a health care provider.    The entire insertion procedure was reviewed with the patient, including care after placement.    Patient's last menstrual period was 09/22/2021 (exact date). Last sexual activity: n/a- LMP today. No allergy to betadine or shellfish. Patient desires STD screening  hCG Urine Qualitative   Date Value Ref Range Status   09/22/2021 Negative Negative Final     Comment:     This test is for screening purposes.  Results should be interpreted along with the clinical picture.  Confirmation testing is available if warranted by ordering [id number], HCG Quantitative Pregnancy.         /85 (BP Location: Right arm, Patient Position: Sitting, Cuff Size: Adult Large)   Pulse 89   Temp 99.6  F (37.6  C) (Oral)   Wt 130.6 kg (288 lb)   SpO2 99%   BMI 40.01 kg/m      Pelvic Exam:   EG/BUS: normal genital architecture without lesions, erythema or abnormal secretions.   Vagina: moist, pink, rugae with physiologic discharge and secretions  Cervix: nulliparous no lesions and pink, moist, closed, without lesion or CMT  Uterus: midline position, mobile, no pain  Adnexa: within normal limits and no masses, nodularity, tenderness    PROCEDURE NOTE: -- IUD Insertion    Reason for Insertion: contraception    Premedicated with ibuprofen.  Under sterile technique, cervix was visualized  with speculum and prepped with Betadine solution swab x 3. Tenaculum was placed for stability. The uterus was gently straightened and sounded to 8.0 cm. IUD prepared for placement, and IUD inserted according to 's instructions without difficulty or significant resitance, and deployed at the fundus. The strings were visualized and trimmed to 5.0 cm from the external os. Tenaculum was removed and hemostasis noted. Speculum removed.  Patient tolerated procedure well.    Lot # [id number]  Exp: April 2022    EBL: minimal    Complications: none    ASSESSMENT:     ICD-10-CM    1. Encounter for insertion of intrauterine contraceptive device  Z30.430 levonorgestrel (MIRENA) 20 MCG/24HR IUD     levonorgestrel (MIRENA) 20 MCG/24HR IUD 20 mcg     INSERTION INTRAUTERINE DEVICE        PLAN:    Given 's handouts, including when to have IUD removed, list of danger s/sx, side effects and follow up recommended. Encouraged condom use for prevention of STD. Back up contraception advised for 7 days if progestin method. Advised to call for any fever, for prolonged or severe pain or bleeding, abnormal vaginal discharge, or unable to palpate strings. She was advised to use pain medications (ibuprofen) as needed for mild to moderate pain. Advised to follow-up in clinic in 4-6 weeks for IUD string check if unable to find strings or as directed by provider.     AISHWARYA Vidal CNP

## 2021-09-23 LAB
C TRACH DNA SPEC QL NAA+PROBE: POSITIVE
N GONORRHOEA DNA SPEC QL NAA+PROBE: NEGATIVE

## 2021-09-23 RX ORDER — AZITHROMYCIN 500 MG/1
1000 TABLET, FILM COATED ORAL DAILY
Qty: 2 TABLET | Refills: 0 | Status: SHIPPED | OUTPATIENT
Start: 2021-09-23 | End: 2021-09-24

## 2021-09-24 ENCOUNTER — TELEPHONE (OUTPATIENT)
Dept: FAMILY MEDICINE | Facility: CLINIC | Age: 26
End: 2021-09-24

## 2021-09-24 NOTE — TELEPHONE ENCOUNTER
Shelbie Vizcarra, RN   9/24/2021 10:38 AM CDT Back to Top        Left message on patient's unidentified voice mail.  Advised patient to call [phone] at her earliest convenience and speak to one of the nurses.     French eSrna,   Here are your recent results.   -Gonorrhea test was normal.  -Chlamydia test shows you have an infection.  ADVISE: treating with antibiotics: azithromycin : 1 g orally as a single dose  - a prescription has been sent to your pharmacy.  Also any sexual partners within the last 60 days should be assessed and treated. Please use condoms 100% of the time to prevent against getting STDs. Follow-up in 3 months for repeat STD testing.        Aby Brantley, CNP

## 2021-09-27 LAB
BKR LAB AP GYN ADEQUACY: NORMAL
BKR LAB AP GYN INTERPRETATION: NORMAL
BKR LAB AP HPV REFLEX: NORMAL
BKR LAB AP PREVIOUS ABNORMAL: NORMAL
PATH REPORT.COMMENTS IMP SPEC: NORMAL
PATH REPORT.RELEVANT HX SPEC: NORMAL

## 2021-09-27 NOTE — TELEPHONE ENCOUNTER
Patient notified of Provider's message as written.  Patient verbalized understanding.    MD report faxed     Disha Desouza RN  [facility]

## 2021-09-28 ENCOUNTER — VIRTUAL VISIT (OUTPATIENT)
Dept: FAMILY MEDICINE | Facility: CLINIC | Age: 26
End: 2021-09-28
Payer: COMMERCIAL

## 2021-09-28 DIAGNOSIS — F41.9 ANXIETY: ICD-10-CM

## 2021-09-28 DIAGNOSIS — F32.1 CURRENT MODERATE EPISODE OF MAJOR DEPRESSIVE DISORDER WITHOUT PRIOR EPISODE (H): ICD-10-CM

## 2021-09-28 PROCEDURE — 99213 OFFICE O/P EST LOW 20 MIN: CPT | Mod: 95 | Performed by: NURSE PRACTITIONER

## 2021-09-28 RX ORDER — SERTRALINE HYDROCHLORIDE 100 MG/1
100 TABLET, FILM COATED ORAL DAILY
Qty: 30 TABLET | Refills: 0 | Status: SHIPPED | OUTPATIENT
Start: 2021-09-28 | End: 2021-10-26

## 2021-09-28 NOTE — PROGRESS NOTES
"Daphne is a 25 year old who is being evaluated via a billable video visit.      How would you like to obtain your AVS? MyChart  If the video visit is dropped, the invitation should be resent by: Text to cell phone: [phone]  Will anyone else be joining your video visit? No      Video Start Time: 10:37 AM    Assessment & Plan     Current moderate episode of major depressive disorder without prior episode (H)  Patient to increase dose as below.  - sertraline (ZOLOFT) 100 MG tablet; Take 1 tablet (100 mg) by mouth daily    Anxiety  As above.   - sertraline (ZOLOFT) 100 MG tablet; Take 1 tablet (100 mg) by mouth daily    Prescription drug management         BMI:   Estimated body mass index is 40.01 kg/m  as calculated from the following:    Height as of 8/31/21: 1.807 m (5' 11.14\").    Weight as of 9/22/21: 130.6 kg (288 lb).           Return in about 4 weeks (around 10/26/2021) for Medication Recheck- sertraline.    AISHWARYA Gonzalez CNP  [facility] TESFAYE Serna is a 25 year old who presents for the following health issues     HPI     Medication Followup of sertraline    Taking Medication as prescribed: yes    Side Effects:  None    Medication Helping Symptoms:  Has not notice much of a change.        Patient denies side effects.  She has not noticed a huge difference in her mood.  Patient notes continued depression symptoms- irritability.  She has been able to go to work every day.  She feels her mood has not been affecting her work as much.  Patient denies thoughts of suicide or self harm.  Anxiety has improved.  She notes anhedonia has improved some.        Review of Systems   Constitutional, HEENT, cardiovascular, pulmonary, gi and gu systems are negative, except as otherwise noted.      Objective           Vitals:  No vitals were obtained today due to virtual visit.    Physical Exam   GENERAL: Healthy, alert and no distress  EYES: Eyes grossly normal to inspection.  No " discharge or erythema, or obvious scleral/conjunctival abnormalities.  RESP: No audible wheeze, cough, or visible cyanosis.  No visible retractions or increased work of breathing.    SKIN: Visible skin clear. No significant rash, abnormal pigmentation or lesions.  NEURO: Cranial nerves grossly intact.  Mentation and speech appropriate for age.  PSYCH: Mentation appears normal, affect normal/bright, judgement and insight intact, normal speech and appearance well-groomed.                Video-Visit Details    Type of service:  Video Visit    Video End Time:10:44 AM    Originating Location (pt. Location): Home    Distant Location (provider location):  [facility]     Platform used for Video Visit: [employer]

## 2021-10-24 ENCOUNTER — HEALTH MAINTENANCE LETTER (OUTPATIENT)
Age: 26
End: 2021-10-24

## 2021-10-26 ENCOUNTER — VIRTUAL VISIT (OUTPATIENT)
Dept: FAMILY MEDICINE | Facility: CLINIC | Age: 26
End: 2021-10-26
Payer: COMMERCIAL

## 2021-10-26 DIAGNOSIS — F41.9 ANXIETY: ICD-10-CM

## 2021-10-26 DIAGNOSIS — F32.1 CURRENT MODERATE EPISODE OF MAJOR DEPRESSIVE DISORDER WITHOUT PRIOR EPISODE (H): ICD-10-CM

## 2021-10-26 PROCEDURE — 99213 OFFICE O/P EST LOW 20 MIN: CPT | Mod: 95 | Performed by: NURSE PRACTITIONER

## 2021-10-26 RX ORDER — SERTRALINE HYDROCHLORIDE 100 MG/1
100 TABLET, FILM COATED ORAL DAILY
Qty: 90 TABLET | Refills: 1 | Status: SHIPPED | OUTPATIENT
Start: 2021-10-26 | End: 2024-10-01

## 2021-10-26 ASSESSMENT — PATIENT HEALTH QUESTIONNAIRE - PHQ9
10. IF YOU CHECKED OFF ANY PROBLEMS, HOW DIFFICULT HAVE THESE PROBLEMS MADE IT FOR YOU TO DO YOUR WORK, TAKE CARE OF THINGS AT HOME, OR GET ALONG WITH OTHER PEOPLE: NOT DIFFICULT AT ALL
SUM OF ALL RESPONSES TO PHQ QUESTIONS 1-9: 5
SUM OF ALL RESPONSES TO PHQ QUESTIONS 1-9: 5

## 2021-10-26 NOTE — PROGRESS NOTES
"Daphne is a 26 year old who is being evaluated via a billable video visit.      How would you like to obtain your AVS? MyChart  If the video visit is dropped, the invitation should be resent by: Send to e-mail at: [email or web address]  Will anyone else be joining your video visit? No      Video Start Time: 10:23 AM    Assessment & Plan     Current moderate episode of major depressive disorder without prior episode (H)  Patient doing well on current dose of medication.  - sertraline (ZOLOFT) 100 MG tablet; Take 1 tablet (100 mg) by mouth daily    Anxiety  As above.   - sertraline (ZOLOFT) 100 MG tablet; Take 1 tablet (100 mg) by mouth daily    Ordering of each unique test  Prescription drug management         BMI:   Estimated body mass index is 40.01 kg/m  as calculated from the following:    Height as of 8/31/21: 1.807 m (5' 11.14\").    Weight as of 9/22/21: 130.6 kg (288 lb).           Return in about 6 months (around 4/26/2022) for Physical Exam, Medication Recheck.    AISHWARYA Gonzalez CNP  [facility]    Bob Serna is a 26 year old who presents for the following health issues     HPI     Medication Followup of sertraline (ZOLOFT) 100 MG tablet    Taking Medication as prescribed: yes    Side Effects:  None    Medication Helping Symptoms:  yes     Patient notes mood is significantly improved on higher dose of sertraline.  She notes irritability and anhedonia have improved.  She continues to work with her therapist.  She has been able to do more socially and is going to work without difficulty.  She notes anxiety is improved.  Patient is sleeping okay. Patient denies thoughts of suicide or self harm.        Review of Systems   Constitutional, HEENT, cardiovascular, pulmonary, gi and gu systems are negative, except as otherwise noted.      Objective           Vitals:  No vitals were obtained today due to virtual visit.    Physical Exam   GENERAL: Healthy, alert and no " distress  EYES: Eyes grossly normal to inspection.  No discharge or erythema, or obvious scleral/conjunctival abnormalities.  RESP: No audible wheeze, cough, or visible cyanosis.  No visible retractions or increased work of breathing.    SKIN: Visible skin clear. No significant rash, abnormal pigmentation or lesions.  NEURO: Cranial nerves grossly intact.  Mentation and speech appropriate for age.  PSYCH: Mentation appears normal, affect normal/bright, judgement and insight intact, normal speech and appearance well-groomed.                Video-Visit Details    Type of service:  Video Visit    Video End Time:10:30 AM    Originating Location (pt. Location): Home    Distant Location (provider location):  [facility]     Platform used for Video Visit: [employer]    Answers for HPI/ROS submitted by the patient on 10/26/2021  If you checked off any problems, how difficult have these problems made it for you to do your work, take care of things at home, or get along with other people?: Not difficult at all  PHQ9 TOTAL SCORE: 5

## 2021-10-27 ASSESSMENT — PATIENT HEALTH QUESTIONNAIRE - PHQ9: SUM OF ALL RESPONSES TO PHQ QUESTIONS 1-9: 5

## 2022-02-13 ENCOUNTER — HEALTH MAINTENANCE LETTER (OUTPATIENT)
Age: 27
End: 2022-02-13

## 2022-10-15 ENCOUNTER — HEALTH MAINTENANCE LETTER (OUTPATIENT)
Age: 27
End: 2022-10-15

## 2023-03-26 ENCOUNTER — HEALTH MAINTENANCE LETTER (OUTPATIENT)
Age: 28
End: 2023-03-26

## 2024-05-26 ENCOUNTER — HEALTH MAINTENANCE LETTER (OUTPATIENT)
Age: 29
End: 2024-05-26

## 2024-09-29 SDOH — HEALTH STABILITY: PHYSICAL HEALTH: ON AVERAGE, HOW MANY DAYS PER WEEK DO YOU ENGAGE IN MODERATE TO STRENUOUS EXERCISE (LIKE A BRISK WALK)?: 2 DAYS

## 2024-09-29 SDOH — HEALTH STABILITY: PHYSICAL HEALTH: ON AVERAGE, HOW MANY MINUTES DO YOU ENGAGE IN EXERCISE AT THIS LEVEL?: 30 MIN

## 2024-09-29 ASSESSMENT — SOCIAL DETERMINANTS OF HEALTH (SDOH): HOW OFTEN DO YOU GET TOGETHER WITH FRIENDS OR RELATIVES?: PATIENT DECLINED

## 2024-10-01 ENCOUNTER — OFFICE VISIT (OUTPATIENT)
Dept: FAMILY MEDICINE | Facility: CLINIC | Age: 29
End: 2024-10-01
Payer: COMMERCIAL

## 2024-10-01 VITALS
HEIGHT: 71 IN | RESPIRATION RATE: 15 BRPM | HEART RATE: 96 BPM | WEIGHT: 293 LBS | TEMPERATURE: 97.8 F | SYSTOLIC BLOOD PRESSURE: 145 MMHG | OXYGEN SATURATION: 98 % | DIASTOLIC BLOOD PRESSURE: 94 MMHG | BODY MASS INDEX: 41.02 KG/M2

## 2024-10-01 DIAGNOSIS — Z00.00 ANNUAL PHYSICAL EXAM: Primary | ICD-10-CM

## 2024-10-01 DIAGNOSIS — Z23 NEED FOR VACCINATION: ICD-10-CM

## 2024-10-01 DIAGNOSIS — F32.0 CURRENT MILD EPISODE OF MAJOR DEPRESSIVE DISORDER, UNSPECIFIED WHETHER RECURRENT (H): ICD-10-CM

## 2024-10-01 DIAGNOSIS — E66.01 MORBID OBESITY (H): ICD-10-CM

## 2024-10-01 LAB
EST. AVERAGE GLUCOSE BLD GHB EST-MCNC: 91 MG/DL
HBA1C MFR BLD: 4.8 % (ref 0–5.6)
HGB BLD-MCNC: 13.3 G/DL (ref 11.7–15.7)

## 2024-10-01 PROCEDURE — 96127 BRIEF EMOTIONAL/BEHAV ASSMT: CPT | Performed by: FAMILY MEDICINE

## 2024-10-01 PROCEDURE — 83036 HEMOGLOBIN GLYCOSYLATED A1C: CPT | Performed by: FAMILY MEDICINE

## 2024-10-01 PROCEDURE — 80061 LIPID PANEL: CPT | Performed by: FAMILY MEDICINE

## 2024-10-01 PROCEDURE — 84443 ASSAY THYROID STIM HORMONE: CPT | Performed by: FAMILY MEDICINE

## 2024-10-01 PROCEDURE — 87389 HIV-1 AG W/HIV-1&-2 AB AG IA: CPT | Performed by: FAMILY MEDICINE

## 2024-10-01 PROCEDURE — 36415 COLL VENOUS BLD VENIPUNCTURE: CPT | Performed by: FAMILY MEDICINE

## 2024-10-01 PROCEDURE — 90480 ADMN SARSCOV2 VAC 1/ONLY CMP: CPT | Performed by: FAMILY MEDICINE

## 2024-10-01 PROCEDURE — 86803 HEPATITIS C AB TEST: CPT | Performed by: FAMILY MEDICINE

## 2024-10-01 PROCEDURE — 80053 COMPREHEN METABOLIC PANEL: CPT | Performed by: FAMILY MEDICINE

## 2024-10-01 PROCEDURE — 91320 SARSCV2 VAC 30MCG TRS-SUC IM: CPT | Performed by: FAMILY MEDICINE

## 2024-10-01 PROCEDURE — 99385 PREV VISIT NEW AGE 18-39: CPT | Mod: 25 | Performed by: FAMILY MEDICINE

## 2024-10-01 PROCEDURE — 99214 OFFICE O/P EST MOD 30 MIN: CPT | Mod: 25 | Performed by: FAMILY MEDICINE

## 2024-10-01 PROCEDURE — 85018 HEMOGLOBIN: CPT | Performed by: FAMILY MEDICINE

## 2024-10-01 PROCEDURE — 90471 IMMUNIZATION ADMIN: CPT | Performed by: FAMILY MEDICINE

## 2024-10-01 PROCEDURE — 82306 VITAMIN D 25 HYDROXY: CPT | Performed by: FAMILY MEDICINE

## 2024-10-01 PROCEDURE — 90656 IIV3 VACC NO PRSV 0.5 ML IM: CPT | Performed by: FAMILY MEDICINE

## 2024-10-01 RX ORDER — ESCITALOPRAM OXALATE 5 MG/1
TABLET ORAL
Qty: 60 TABLET | Refills: 1 | Status: SHIPPED | OUTPATIENT
Start: 2024-10-01

## 2024-10-01 ASSESSMENT — PATIENT HEALTH QUESTIONNAIRE - PHQ9
SUM OF ALL RESPONSES TO PHQ QUESTIONS 1-9: 22
10. IF YOU CHECKED OFF ANY PROBLEMS, HOW DIFFICULT HAVE THESE PROBLEMS MADE IT FOR YOU TO DO YOUR WORK, TAKE CARE OF THINGS AT HOME, OR GET ALONG WITH OTHER PEOPLE: EXTREMELY DIFFICULT
SUM OF ALL RESPONSES TO PHQ QUESTIONS 1-9: 22

## 2024-10-01 ASSESSMENT — PAIN SCALES - GENERAL: PAINLEVEL: NO PAIN (0)

## 2024-10-01 NOTE — PROGRESS NOTES
"Preventive Care Visit  [facility] RIVKA Sumner MD, Family Medicine  Oct 1, 2024      Assessment & Plan     Annual physical exam  Fasting labs ordered as below.  Await the results.  Patient would like to delay her Pap smear until the next visit.  - Lipid panel reflex to direct LDL Fasting; Future  - Comprehensive metabolic panel; Future  - Hemoglobin; Future  - Vitamin D Deficiency; Future  - Hepatitis C Screen Reflex to HCV RNA Quant and Genotype; Future  - HIV Antigen Antibody Combo Cascade; Future  - Lipid panel reflex to direct LDL Fasting  - Comprehensive metabolic panel  - Hemoglobin  - Vitamin D Deficiency  - Hepatitis C Screen Reflex to HCV RNA Quant and Genotype  - HIV Antigen Antibody Combo Cascade    Current mild episode of major depressive disorder, unspecified whether recurrent (H)  Try the patient on escitalopram 5 mg daily and in 2 weeks increase it to 10 mg daily.  I will see her back in about a month for recheck.  Mechanism of action side effect profile reviewed in detail.  Instructed never to abruptly stop the medication.  - escitalopram (LEXAPRO) 5 MG tablet; Use 1 tab daily for 2 weeks and then use 2 tabs daily.    Morbid obesity (H)  BMI of 51.8 noted.  Patient is interested in starting a GLP-1  Injectable medication to lose weight.  Recommending to get labs checked first and then I will order Zepbound or Saxenda  - TSH with free T4 reflex; Future  - Hemoglobin A1c; Future  - TSH with free T4 reflex  - Hemoglobin A1c    Need for vaccination    - INFLUENZA VACCINE, SPLIT VIRUS, TRIVALENT,PF (FLUZONE\FLUARIX)  - COVID-19 12+ (PFIZER)          BMI  Estimated body mass index is 51.88 kg/m  as calculated from the following:    Height as of this encounter: 1.803 m (5' 11\").    Weight as of this encounter: 168.7 kg (372 lb).   Weight management plan: Discussed healthy diet and exercise guidelines          Bob Serna is a 28 year old, presenting for the " following:  Physical        10/1/2024     9:21 AM   Additional Questions   Roomed by Ali        Health Care Directive  Patient does not have a Health Care Directive or Living Will: Discussed advance care planning with patient; however, patient declined at this time.    HPI              9/29/2024   General Health   How would you rate your overall physical health? (!) POOR   Feel stress (tense, anxious, or unable to sleep) Very much      (!) STRESS CONCERN      9/29/2024   Nutrition   Three or more servings of calcium each day? Yes   Diet: Carbohydrate counting   How many servings of fruit and vegetables per day? (!) 2-3   How many sweetened beverages each day? (!) 2            9/29/2024   Exercise   Days per week of moderate/strenous exercise 2 days   Average minutes spent exercising at this level 30 min      (!) EXERCISE CONCERN      9/29/2024   Social Factors   Frequency of gathering with friends or relatives Patient declined   Worry food won't last until get money to buy more No   Food not last or not have enough money for food? No   Do you have housing? (Housing is defined as stable permanent housing and does not include staying ouside in a car, in a tent, in an abandoned building, in an overnight shelter, or couch-surfing.) Yes   Are you worried about losing your housing? No   Lack of transportation? No   Unable to get utilities (heat,electricity)? No            9/29/2024   Dental   Dentist two times every year? (!) NO            9/29/2024   TB Screening   Were you born outside of the [place]? No          Today's PHQ-9 Score:       10/1/2024     8:40 AM   PHQ-9 SCORE   PHQ-9 Total Score MyChart 22 (Severe depression)   PHQ-9 Total Score 22         9/29/2024   Substance Use   Alcohol more than 3/day or more than 7/wk No   Do you use any other substances recreationally? No        Social History     Tobacco Use    Smoking status: Never    Smokeless tobacco: Current   Substance Use Topics    Alcohol use: Yes    Drug use:  No          Mammogram Screening - Patient under 40 years of age: Routine Mammogram Screening not recommended.           9/29/2024   One time HIV Screening   Previous HIV test? I don't know          9/29/2024   STI Screening   New sexual partner(s) since last STI/HIV test? (!) YES         History of abnormal Pap smear: No - age 21-29 PAP every 3 years recommended        9/22/2021    12:35 PM 1/10/2017    12:00 AM   PAP / HPV   PAP Negative for Intraepithelial Lesion or Malignancy (NILM)     PAP (Historical)  NIL            9/29/2024   Contraception/Family Planning   Questions about contraception or family planning (!) YES            Reviewed and updated as needed this visit by Provider    Allergies                 Patient Active Problem List   Diagnosis    Calculus of gallbladder without cholecystitis without obstruction    Morbid obesity (H)     Past Surgical History:   Procedure Laterality Date    AS RAD RESEC TONSIL/PILLARS  2002    ENT SURGERY      adenoidectomy       Social History     Tobacco Use    Smoking status: Never    Smokeless tobacco: Current   Substance Use Topics    Alcohol use: Yes     Family History   Problem Relation Age of Onset    Cerebrovascular Disease Paternal Half-Sister     Hypertension Mother     Coronary Artery Disease Mother     Diabetes Mother     Anxiety Disorder Mother     Obesity Mother     Hypertension Father     Coronary Artery Disease Father     Obesity Father     Diabetes Maternal Grandmother     Coronary Artery Disease Maternal Grandmother     Diabetes Maternal Grandfather     Hypertension Paternal Grandmother     Coronary Artery Disease Paternal Grandmother     Obesity Paternal Grandmother     Hypertension Paternal Grandfather     Coronary Artery Disease Paternal Grandfather     Obesity Paternal Grandfather     Depression Sister     Anxiety Disorder Sister     Obesity Sister     Mental Illness Brother         Autism         Current Outpatient Medications   Medication Sig  "Dispense Refill    escitalopram (LEXAPRO) 5 MG tablet Use 1 tab daily for 2 weeks and then use 2 tabs daily. 60 tablet 1    levonorgestrel (MIRENA) 20 MCG/24HR IUD 1 each (20 mcg) by Intrauterine route once       No Known Allergies      Review of Systems  CONSTITUTIONAL: NEGATIVE for fever, chills, change in weight  INTEGUMENTARY/SKIN: NEGATIVE for worrisome rashes, moles or lesions  EYES: NEGATIVE for vision changes or irritation  ENT/MOUTH: NEGATIVE for ear, mouth and throat problems  RESP: NEGATIVE for significant cough or SOB  BREAST: NEGATIVE for masses, tenderness or discharge  CV: NEGATIVE for chest pain, palpitations or peripheral edema  GI: NEGATIVE for nausea, abdominal pain, heartburn, or change in bowel habits  : NEGATIVE for frequency, dysuria, or hematuria  MUSCULOSKELETAL: NEGATIVE for significant arthralgias or myalgia  NEURO: NEGATIVE for weakness, dizziness or paresthesias  ENDOCRINE: NEGATIVE for temperature intolerance, skin/hair changes  HEME: NEGATIVE for bleeding problems  PSYCHIATRIC: NEGATIVE for changes in mood or affect     Objective    Exam  BP (!) 145/94 (BP Location: Left arm, Patient Position: Sitting, Cuff Size: Adult Large)   Pulse 96   Temp 97.8  F (36.6  C) (Temporal)   Resp 15   Ht 1.803 m (5' 11\")   Wt (!) 168.7 kg (372 lb)   SpO2 98%   BMI 51.88 kg/m     Estimated body mass index is 51.88 kg/m  as calculated from the following:    Height as of this encounter: 1.803 m (5' 11\").    Weight as of this encounter: 168.7 kg (372 lb).    Physical Exam  GENERAL: alert and no distress  EYES: Eyes grossly normal to inspection, PERRL and conjunctivae and sclerae normal  HENT: ear canals and TM's normal, nose and mouth without ulcers or lesions  NECK: no adenopathy, no asymmetry, masses, or scars  RESP: lungs clear to auscultation - no rales, rhonchi or wheezes  CV: regular rate and rhythm, normal S1 S2, no S3 or S4, no murmur, click or rub, no peripheral edema  ABDOMEN: soft, " nontender, no hepatosplenomegaly, no masses and bowel sounds normal  MS: no gross musculoskeletal defects noted, no edema  SKIN: no suspicious lesions or rashes  NEURO: Normal strength and tone, mentation intact and speech normal  PSYCH: mentation appears normal, affect normal/bright        Signed Electronically by: Farhat Sumner MD    Answers submitted by the patient for this visit:  Patient Health Questionnaire (Submitted on 10/1/2024)  If you checked off any problems, how difficult have these problems made it for you to do your work, take care of things at home, or get along with other people?: Extremely difficult  PHQ9 TOTAL SCORE: 22

## 2024-10-02 LAB
ALBUMIN SERPL BCG-MCNC: 4.7 G/DL (ref 3.5–5.2)
ALP SERPL-CCNC: 97 U/L (ref 40–150)
ALT SERPL W P-5'-P-CCNC: 27 U/L (ref 0–50)
ANION GAP SERPL CALCULATED.3IONS-SCNC: 11 MMOL/L (ref 7–15)
AST SERPL W P-5'-P-CCNC: 21 U/L (ref 0–45)
BILIRUB SERPL-MCNC: 0.4 MG/DL
BUN SERPL-MCNC: 11.4 MG/DL (ref 6–20)
CALCIUM SERPL-MCNC: 9.3 MG/DL (ref 8.8–10.4)
CHLORIDE SERPL-SCNC: 104 MMOL/L (ref 98–107)
CHOLEST SERPL-MCNC: 198 MG/DL
CREAT SERPL-MCNC: 0.71 MG/DL (ref 0.51–0.95)
EGFRCR SERPLBLD CKD-EPI 2021: >90 ML/MIN/1.73M2
FASTING STATUS PATIENT QL REPORTED: YES
FASTING STATUS PATIENT QL REPORTED: YES
GLUCOSE SERPL-MCNC: 84 MG/DL (ref 70–99)
HCO3 SERPL-SCNC: 22 MMOL/L (ref 22–29)
HCV AB SERPL QL IA: NONREACTIVE
HDLC SERPL-MCNC: 60 MG/DL
HIV 1+2 AB+HIV1 P24 AG SERPL QL IA: NONREACTIVE
LDLC SERPL CALC-MCNC: 130 MG/DL
NONHDLC SERPL-MCNC: 138 MG/DL
POTASSIUM SERPL-SCNC: 4.5 MMOL/L (ref 3.4–5.3)
PROT SERPL-MCNC: 8.4 G/DL (ref 6.4–8.3)
SODIUM SERPL-SCNC: 137 MMOL/L (ref 135–145)
TRIGL SERPL-MCNC: 41 MG/DL
TSH SERPL DL<=0.005 MIU/L-ACNC: 1.64 UIU/ML (ref 0.3–4.2)
VIT D+METAB SERPL-MCNC: 16 NG/ML (ref 20–50)

## 2024-10-04 ENCOUNTER — TELEPHONE (OUTPATIENT)
Dept: FAMILY MEDICINE | Facility: CLINIC | Age: 29
End: 2024-10-04
Payer: COMMERCIAL

## 2024-10-04 NOTE — TELEPHONE ENCOUNTER
Reason for Call:  Other prescription    Detailed comments: Patient would like a call back to discuss alternative medication because the Zepbound is too expensive. Please call.     Phone Number Patient can be reached at: Home number on file [phone] (home)    Best Time: before 1:00 pm    Can we leave a detailed message on this number? YES    Call taken on 10/4/2024 at 11:04 AM by Tiffany Arita

## 2024-10-07 NOTE — TELEPHONE ENCOUNTER
Triage Patient Outreach    Attempt # 1    Was call answered?  No.  Left detailed message for patient.  Pt to call back if further questions.      Jayla Faye RN

## 2024-10-07 NOTE — TELEPHONE ENCOUNTER
Please asked to check with insurance company if an alternate medication is covered or not.  If not, we could try ordering the same medication from the [facility] pharmacy but that would be a self-pay which she can review with the pharmacy and decide on that.  It would be much cheaper than the other form available at the pharmacies    If she would like to proceed, let me know    Farhat Sumner MD  [facility], [facility] [place]

## 2024-10-21 DIAGNOSIS — E66.01 MORBID OBESITY (H): Primary | ICD-10-CM

## 2024-10-21 NOTE — PROGRESS NOTES
Compounded semaglutide ordered to the [facility] pharmacy.  Please notify the patient  to contact the [facility] pharmacy I would like her to schedule a 3-month follow-up visit with me.  Please assist her with booking the visit.      Farhat Sumner MD  [facility], [facility] [place]

## 2024-10-22 ENCOUNTER — MYC MEDICAL ADVICE (OUTPATIENT)
Dept: FAMILY MEDICINE | Facility: CLINIC | Age: 29
End: 2024-10-22
Payer: COMMERCIAL

## 2024-11-07 ENCOUNTER — OFFICE VISIT (OUTPATIENT)
Dept: FAMILY MEDICINE | Facility: CLINIC | Age: 29
End: 2024-11-07
Payer: COMMERCIAL

## 2024-11-07 VITALS
BODY MASS INDEX: 41.02 KG/M2 | WEIGHT: 293 LBS | SYSTOLIC BLOOD PRESSURE: 132 MMHG | HEIGHT: 71 IN | DIASTOLIC BLOOD PRESSURE: 86 MMHG | RESPIRATION RATE: 16 BRPM | TEMPERATURE: 96.6 F | HEART RATE: 91 BPM | OXYGEN SATURATION: 95 %

## 2024-11-07 DIAGNOSIS — F32.0 CURRENT MILD EPISODE OF MAJOR DEPRESSIVE DISORDER, UNSPECIFIED WHETHER RECURRENT (H): ICD-10-CM

## 2024-11-07 DIAGNOSIS — E66.01 MORBID OBESITY (H): ICD-10-CM

## 2024-11-07 DIAGNOSIS — Z12.4 CERVICAL CANCER SCREENING: Primary | ICD-10-CM

## 2024-11-07 PROCEDURE — 99214 OFFICE O/P EST MOD 30 MIN: CPT | Mod: 25 | Performed by: FAMILY MEDICINE

## 2024-11-07 PROCEDURE — G0145 SCR C/V CYTO,THINLAYER,RESCR: HCPCS | Performed by: FAMILY MEDICINE

## 2024-11-07 PROCEDURE — 96127 BRIEF EMOTIONAL/BEHAV ASSMT: CPT | Performed by: FAMILY MEDICINE

## 2024-11-07 PROCEDURE — 90715 TDAP VACCINE 7 YRS/> IM: CPT | Performed by: FAMILY MEDICINE

## 2024-11-07 PROCEDURE — 90471 IMMUNIZATION ADMIN: CPT | Performed by: FAMILY MEDICINE

## 2024-11-07 RX ORDER — ESCITALOPRAM OXALATE 10 MG/1
10 TABLET ORAL DAILY
Qty: 90 TABLET | Refills: 0 | Status: SHIPPED | OUTPATIENT
Start: 2024-11-07

## 2024-11-07 ASSESSMENT — ANXIETY QUESTIONNAIRES
GAD7 TOTAL SCORE: 4
1. FEELING NERVOUS, ANXIOUS, OR ON EDGE: SEVERAL DAYS
GAD7 TOTAL SCORE: 4
3. WORRYING TOO MUCH ABOUT DIFFERENT THINGS: SEVERAL DAYS
IF YOU CHECKED OFF ANY PROBLEMS ON THIS QUESTIONNAIRE, HOW DIFFICULT HAVE THESE PROBLEMS MADE IT FOR YOU TO DO YOUR WORK, TAKE CARE OF THINGS AT HOME, OR GET ALONG WITH OTHER PEOPLE: SOMEWHAT DIFFICULT
2. NOT BEING ABLE TO STOP OR CONTROL WORRYING: SEVERAL DAYS
5. BEING SO RESTLESS THAT IT IS HARD TO SIT STILL: NOT AT ALL
7. FEELING AFRAID AS IF SOMETHING AWFUL MIGHT HAPPEN: NOT AT ALL
6. BECOMING EASILY ANNOYED OR IRRITABLE: NOT AT ALL

## 2024-11-07 ASSESSMENT — PATIENT HEALTH QUESTIONNAIRE - PHQ9
5. POOR APPETITE OR OVEREATING: SEVERAL DAYS
SUM OF ALL RESPONSES TO PHQ QUESTIONS 1-9: 6

## 2024-11-07 NOTE — PROGRESS NOTES
Assessment & Plan     Cervical cancer screening    - Pap Screen Reflex to HPV if ASCUS - Recommended Age 25 - 29 Years    Current mild episode of major depressive disorder, unspecified whether recurrent (H)  Symptoms are very well-managed.  PHQ-9 score is 6 as compared to 22 since starting the medication.  She would like to continue the same dose for now.  I agree with that.  Follow-up in the next 2 to 3 months again  - escitalopram (LEXAPRO) 10 MG tablet; Take 1 tablet (10 mg) by mouth daily.    Morbid obesity (H)  Patient was not aware that semaglutide was already ordered to the compounded  pharmacy.  She will contact them immediately to get that started.  I have instructed her  to message me the start date so I can plan the future follow-up plan for her.  Patient agrees to that        The longitudinal plan of care for the diagnosis(es)/condition(s) as documented were addressed during this visit. Due to the added complexity in care, I will continue to support Daphne in the subsequent management and with ongoing continuity of care.          Subjective   Daphne is a 29 year old, presenting for the following health issues:   Follow Up        11/7/2024     8:56 AM   Additional Questions   Roomed by Ewa CARBONE     History of Present Illness       Reason for visit:  Medication follow up and pap smear   She is taking medications regularly.         Depression and Anxiety   How are you doing with your depression since your last visit? Improved   How are you doing with your anxiety since your last visit?  No change  Are you having other symptoms that might be associated with depression or anxiety? No  Have you had a significant life event? No   Do you have any concerns with your use of alcohol or other drugs? No    Social History     Tobacco Use    Smoking status: Never    Smokeless tobacco: Current   Vaping Use    Vaping status: Some Days    Substances: Nicotine, THC, Flavoring    Devices: Disposable   Substance Use  Topics    Alcohol use: Yes    Drug use: No         10/26/2021    10:08 AM 10/1/2024     8:40 AM 11/7/2024     8:59 AM   PHQ   PHQ-9 Total Score 5 22 6   Q9: Thoughts of better off dead/self-harm past 2 weeks Not at all  Several days  Not at all   F/U: Thoughts of suicide or self-harm  No     F/U: Safety concerns  No         Patient-reported         8/31/2021    12:50 PM 11/7/2024     8:52 AM   KATHIA-7 SCORE   Total Score 11 4         11/7/2024     8:59 AM   Last PHQ-9   1.  Little interest or pleasure in doing things 1   2.  Feeling down, depressed, or hopeless 2   3.  Trouble falling or staying asleep, or sleeping too much 1   4.  Feeling tired or having little energy 2   5.  Poor appetite or overeating 0   6.  Feeling bad about yourself 0   7.  Trouble concentrating 0   8.  Moving slowly or restless 0   Q9: Thoughts of better off dead/self-harm past 2 weeks 0   PHQ-9 Total Score 6   Difficulty at work, home, or with people Not difficult at all         11/7/2024     8:52 AM   KATHIA-7    1. Feeling nervous, anxious, or on edge 1   2. Not being able to stop or control worrying 1   3. Worrying too much about different things 1   4. Trouble relaxing 1   5. Being so restless that it is hard to sit still 0   6. Becoming easily annoyed or irritable 0   7. Feeling afraid, as if something awful might happen 0   KATHIA-7 Total Score 4   If you checked any problems, how difficult have they made it for you to do your work, take care of things at home, or get along with other people? Somewhat difficult       Suicide Assessment Five-step Evaluation and Treatment (SAFE-T)    How many servings of fruits and vegetables do you eat daily?  4 or more  On average, how many sweetened beverages do you drink each day (Examples: soda, juice, sweet tea, etc.  Do NOT count diet or artificially sweetened beverages)?   2  How many days per week do you exercise enough to make your heart beat faster? 3 or less  How many minutes a day do you exercise  "enough to make your heart beat faster? 30 - 60  How many days per week do you miss taking your medication? 0            Objective    /86 (BP Location: Right arm, Patient Position: Sitting, Cuff Size: Adult Large)   Pulse 91   Temp (!) 96.6  F (35.9  C) (Temporal)   Resp 16   Ht 1.803 m (5' 11\")   Wt (!) 164.7 kg (363 lb 3.2 oz)   LMP  (LMP Unknown)   SpO2 95%   BMI 50.66 kg/m    Body mass index is 50.66 kg/m .  Physical Exam   GENERAL: alert and no distress  NECK: no adenopathy, no asymmetry, masses, or scars  RESP: lungs clear to auscultation - no rales, rhonchi or wheezes  CV: regular rate and rhythm, normal S1 S2, no S3 or S4, no murmur, click or rub, no peripheral edema  ABDOMEN: soft, nontender, no hepatosplenomegaly, no masses and bowel sounds normal   (female): normal female external genitalia, normal urethral meatus, normal vaginal mucosa  MS: no gross musculoskeletal defects noted, no edema            Signed Electronically by: Farhat Sumner MD    "

## 2024-11-12 LAB
BKR LAB AP GYN ADEQUACY: NORMAL
BKR LAB AP GYN INTERPRETATION: NORMAL
BKR LAB AP HPV REFLEX: NORMAL
BKR LAB AP PREVIOUS ABNORMAL: NORMAL
PATH REPORT.COMMENTS IMP SPEC: NORMAL
PATH REPORT.COMMENTS IMP SPEC: NORMAL
PATH REPORT.RELEVANT HX SPEC: NORMAL

## 2025-02-18 DIAGNOSIS — F32.0 CURRENT MILD EPISODE OF MAJOR DEPRESSIVE DISORDER, UNSPECIFIED WHETHER RECURRENT: ICD-10-CM

## 2025-02-18 RX ORDER — ESCITALOPRAM OXALATE 10 MG/1
10 TABLET ORAL DAILY
Qty: 90 TABLET | Refills: 0 | Status: SHIPPED | OUTPATIENT
Start: 2025-02-18

## 2025-09-01 ENCOUNTER — PATIENT OUTREACH (OUTPATIENT)
Dept: CARE COORDINATION | Facility: CLINIC | Age: 30
End: 2025-09-01
Payer: COMMERCIAL